# Patient Record
Sex: MALE | Race: WHITE | Employment: FULL TIME | URBAN - METROPOLITAN AREA
[De-identification: names, ages, dates, MRNs, and addresses within clinical notes are randomized per-mention and may not be internally consistent; named-entity substitution may affect disease eponyms.]

---

## 2017-03-24 ENCOUNTER — TELEPHONE (OUTPATIENT)
Dept: FAMILY MEDICINE CLINIC | Age: 37
End: 2017-03-24

## 2017-04-28 ENCOUNTER — PATIENT MESSAGE (OUTPATIENT)
Dept: FAMILY MEDICINE CLINIC | Age: 37
End: 2017-04-28

## 2017-04-28 ENCOUNTER — OFFICE VISIT (OUTPATIENT)
Dept: FAMILY MEDICINE CLINIC | Age: 37
End: 2017-04-28

## 2017-04-28 VITALS
BODY MASS INDEX: 39.17 KG/M2 | HEART RATE: 84 BPM | DIASTOLIC BLOOD PRESSURE: 78 MMHG | WEIGHT: 313.4 LBS | TEMPERATURE: 97.6 F | SYSTOLIC BLOOD PRESSURE: 128 MMHG | RESPIRATION RATE: 16 BRPM

## 2017-04-28 DIAGNOSIS — I89.0 LYMPHEDEMA OF LEFT LEG: ICD-10-CM

## 2017-04-28 DIAGNOSIS — J30.1 ALLERGIC RHINITIS DUE TO POLLEN, UNSPECIFIED RHINITIS SEASONALITY: ICD-10-CM

## 2017-04-28 DIAGNOSIS — I10 ESSENTIAL HYPERTENSION: ICD-10-CM

## 2017-04-28 DIAGNOSIS — E78.5 DYSLIPIDEMIA: ICD-10-CM

## 2017-04-28 DIAGNOSIS — K21.9 GASTROESOPHAGEAL REFLUX DISEASE WITHOUT ESOPHAGITIS: ICD-10-CM

## 2017-04-28 DIAGNOSIS — R73.01 IFG (IMPAIRED FASTING GLUCOSE): Primary | ICD-10-CM

## 2017-04-28 LAB — HBA1C MFR BLD: 6.1 %

## 2017-04-28 PROCEDURE — G8417 CALC BMI ABV UP PARAM F/U: HCPCS | Performed by: FAMILY MEDICINE

## 2017-04-28 PROCEDURE — 83036 HEMOGLOBIN GLYCOSYLATED A1C: CPT | Performed by: FAMILY MEDICINE

## 2017-04-28 PROCEDURE — G8427 DOCREV CUR MEDS BY ELIG CLIN: HCPCS | Performed by: FAMILY MEDICINE

## 2017-04-28 PROCEDURE — 99214 OFFICE O/P EST MOD 30 MIN: CPT | Performed by: FAMILY MEDICINE

## 2017-04-28 PROCEDURE — 1036F TOBACCO NON-USER: CPT | Performed by: FAMILY MEDICINE

## 2017-04-28 ASSESSMENT — ENCOUNTER SYMPTOMS
SHORTNESS OF BREATH: 0
BLOOD IN STOOL: 0
ABDOMINAL PAIN: 0
ANAL BLEEDING: 0
CHEST TIGHTNESS: 0
NAUSEA: 0
CONSTIPATION: 0
VOMITING: 0
DIARRHEA: 0

## 2017-05-02 ENCOUNTER — TELEPHONE (OUTPATIENT)
Dept: FAMILY MEDICINE CLINIC | Age: 37
End: 2017-05-02

## 2017-05-02 DIAGNOSIS — Z83.2 FAMILY HISTORY OF FACTOR V LEIDEN MUTATION: Primary | ICD-10-CM

## 2017-06-23 DIAGNOSIS — I10 ESSENTIAL HYPERTENSION: ICD-10-CM

## 2017-06-23 RX ORDER — LISINOPRIL AND HYDROCHLOROTHIAZIDE 12.5; 1 MG/1; MG/1
1 TABLET ORAL DAILY
Qty: 90 TABLET | Refills: 3 | Status: SHIPPED | OUTPATIENT
Start: 2017-06-23 | End: 2018-06-17 | Stop reason: SDUPTHER

## 2017-09-12 DIAGNOSIS — J30.1 ALLERGIC RHINITIS DUE TO POLLEN: ICD-10-CM

## 2017-09-13 RX ORDER — FLUTICASONE PROPIONATE 50 MCG
SPRAY, SUSPENSION (ML) NASAL
Qty: 3 BOTTLE | Refills: 3 | Status: SHIPPED | OUTPATIENT
Start: 2017-09-13 | End: 2018-06-26 | Stop reason: SDUPTHER

## 2017-09-25 ENCOUNTER — OFFICE VISIT (OUTPATIENT)
Dept: FAMILY MEDICINE CLINIC | Age: 37
End: 2017-09-25
Payer: COMMERCIAL

## 2017-09-25 VITALS
DIASTOLIC BLOOD PRESSURE: 74 MMHG | HEIGHT: 75 IN | HEART RATE: 72 BPM | BODY MASS INDEX: 37.95 KG/M2 | SYSTOLIC BLOOD PRESSURE: 124 MMHG | RESPIRATION RATE: 16 BRPM | WEIGHT: 305.2 LBS | TEMPERATURE: 97.4 F

## 2017-09-25 DIAGNOSIS — M54.2 ANTERIOR NECK PAIN: Primary | ICD-10-CM

## 2017-09-25 DIAGNOSIS — R22.1 NECK FULLNESS: ICD-10-CM

## 2017-09-25 PROCEDURE — G8427 DOCREV CUR MEDS BY ELIG CLIN: HCPCS | Performed by: FAMILY MEDICINE

## 2017-09-25 PROCEDURE — 1036F TOBACCO NON-USER: CPT | Performed by: FAMILY MEDICINE

## 2017-09-25 PROCEDURE — G8417 CALC BMI ABV UP PARAM F/U: HCPCS | Performed by: FAMILY MEDICINE

## 2017-09-25 PROCEDURE — 99213 OFFICE O/P EST LOW 20 MIN: CPT | Performed by: FAMILY MEDICINE

## 2017-09-25 RX ORDER — CEFUROXIME AXETIL 250 MG/1
250 TABLET ORAL 2 TIMES DAILY
Qty: 28 TABLET | Refills: 0 | Status: SHIPPED | OUTPATIENT
Start: 2017-09-25 | End: 2017-10-09

## 2017-09-25 ASSESSMENT — ENCOUNTER SYMPTOMS
SORE THROAT: 0
SINUS PRESSURE: 0
DIARRHEA: 0
CONSTIPATION: 0
RHINORRHEA: 0
EYE PAIN: 0
BLOOD IN STOOL: 0
CHEST TIGHTNESS: 0
VOMITING: 0
EYE REDNESS: 0
EYE ITCHING: 0
SHORTNESS OF BREATH: 0
COUGH: 0
EYE DISCHARGE: 0
ABDOMINAL PAIN: 0
ANAL BLEEDING: 0
NAUSEA: 0

## 2018-02-28 ENCOUNTER — OFFICE VISIT (OUTPATIENT)
Dept: FAMILY MEDICINE CLINIC | Age: 38
End: 2018-02-28
Payer: COMMERCIAL

## 2018-02-28 VITALS
HEART RATE: 76 BPM | DIASTOLIC BLOOD PRESSURE: 64 MMHG | HEIGHT: 75 IN | BODY MASS INDEX: 37.47 KG/M2 | TEMPERATURE: 97.9 F | SYSTOLIC BLOOD PRESSURE: 130 MMHG | WEIGHT: 301.4 LBS | RESPIRATION RATE: 16 BRPM

## 2018-02-28 DIAGNOSIS — R73.01 IFG (IMPAIRED FASTING GLUCOSE): ICD-10-CM

## 2018-02-28 DIAGNOSIS — K21.9 GASTROESOPHAGEAL REFLUX DISEASE WITHOUT ESOPHAGITIS: ICD-10-CM

## 2018-02-28 DIAGNOSIS — I10 ESSENTIAL HYPERTENSION: ICD-10-CM

## 2018-02-28 DIAGNOSIS — I89.0 LYMPHEDEMA OF LEFT LEG: ICD-10-CM

## 2018-02-28 DIAGNOSIS — Z00.00 WELL ADULT EXAM: Primary | ICD-10-CM

## 2018-02-28 DIAGNOSIS — R53.83 OTHER FATIGUE: ICD-10-CM

## 2018-02-28 DIAGNOSIS — J30.1 CHRONIC ALLERGIC RHINITIS DUE TO POLLEN, UNSPECIFIED SEASONALITY: ICD-10-CM

## 2018-02-28 PROCEDURE — 99395 PREV VISIT EST AGE 18-39: CPT | Performed by: FAMILY MEDICINE

## 2018-02-28 RX ORDER — FUROSEMIDE 40 MG/1
40 TABLET ORAL DAILY PRN
Qty: 30 TABLET | Refills: 0 | Status: SHIPPED | OUTPATIENT
Start: 2018-02-28 | End: 2019-06-04 | Stop reason: SDUPTHER

## 2018-02-28 RX ORDER — POTASSIUM CHLORIDE 20 MEQ/1
20 TABLET, EXTENDED RELEASE ORAL DAILY PRN
Qty: 30 TABLET | Refills: 0 | Status: SHIPPED | OUTPATIENT
Start: 2018-02-28 | End: 2019-06-04 | Stop reason: SDUPTHER

## 2018-02-28 ASSESSMENT — PATIENT HEALTH QUESTIONNAIRE - PHQ9
2. FEELING DOWN, DEPRESSED OR HOPELESS: 0
SUM OF ALL RESPONSES TO PHQ QUESTIONS 1-9: 0
SUM OF ALL RESPONSES TO PHQ9 QUESTIONS 1 & 2: 0
1. LITTLE INTEREST OR PLEASURE IN DOING THINGS: 0

## 2018-02-28 ASSESSMENT — ENCOUNTER SYMPTOMS
ANAL BLEEDING: 0
DIARRHEA: 0
CONSTIPATION: 0
CHEST TIGHTNESS: 0
BLOOD IN STOOL: 0
ABDOMINAL PAIN: 0
VOMITING: 0
SHORTNESS OF BREATH: 0
NAUSEA: 0

## 2018-02-28 NOTE — PROGRESS NOTES
[facility]  [facility]  Dept: [phone]  Dept Fax: [phone]    JENNIFER Calixto is a 42 y.o.male    Pt presents for annual wellness physical exam.      Pt complains of increasing Lymphedema in left lower extremity. No associated SOB, paroxysmal Nocturnal Dyspnea. Pt denies any lower extremity warmth, redness, or tenderness. Pt interested in potentially seeing Lymphedema Clinic or possible intermittent Lasix use. The home BP readings have been in the 110-120's / 70-80's range. Otherwise, pt feeling ok at this time. Weight decreased 12# since last visit 4/2017    Patient Active Problem List   Diagnosis    Dyslipidemia    Lymphedema of left leg- 1995- due to recurrent cellulitis    Essential hypertension    Gastroesophageal reflux disease without esophagitis    Allergic rhinitis due to pollen       Current Outpatient Prescriptions   Medication Sig Dispense Refill    furosemide (LASIX) 40 MG tablet Take 1 tablet by mouth daily as needed (edema) 30 tablet 0    potassium chloride (KLOR-CON M) 20 MEQ extended release tablet Take 1 tablet by mouth daily as needed (edema/ Lasix use) 30 tablet 0    fluticasone (FLONASE) 50 MCG/ACT nasal spray instill 2 spray into each nostril once daily 3 Bottle 3    lisinopril-hydrochlorothiazide (PRINZIDE;ZESTORETIC) 10-12.5 MG per tablet Take 1 tablet by mouth daily 90 tablet 3    ranitidine (ZANTAC) 150 MG tablet Take 150 mg by mouth daily        No current facility-administered medications for this visit. Review of Systems   Constitutional: Positive for fatigue. Negative for chills, diaphoresis, fever and unexpected weight change. Eyes: Negative for visual disturbance. Respiratory: Negative for chest tightness and shortness of breath. Cardiovascular: Positive for leg swelling. Negative for chest pain and palpitations.    Gastrointestinal: Negative for abdominal pain, anal bleeding, blood in stool,

## 2018-03-16 LAB
ABSOLUTE BASO #: 0 K/UL (ref 0–0.1)
ABSOLUTE EOS #: 0.1 K/UL (ref 0.1–0.4)
ABSOLUTE LYMPH #: 0.7 K/UL (ref 0.8–5.2)
ABSOLUTE MONO #: 0.7 K/UL (ref 0.1–0.9)
ABSOLUTE NEUT #: 4.7 K/UL (ref 1.3–9.1)
ALBUMIN SERPL-MCNC: 3.3 G/DL (ref 3.5–5.2)
ALK PHOSPHATASE: 47 U/L (ref 39–118)
ALT SERPL-CCNC: 43 U/L (ref 5–50)
ANION GAP SERPL CALCULATED.3IONS-SCNC: 8 MEQ/L (ref 10–19)
AST SERPL-CCNC: 26 U/L (ref 9–50)
AVERAGE GLUCOSE: 123 MG/DL (ref 66–114)
BASOPHILS RELATIVE PERCENT: 0.3 %
BILIRUB SERPL-MCNC: 0.4 MG/DL
BUN BLDV-MCNC: 21 MG/DL (ref 8–23)
CALCIUM SERPL-MCNC: 8.1 MG/DL (ref 8.5–10.5)
CHLORIDE BLD-SCNC: 102 MEQ/L (ref 95–107)
CHOLESTEROL/HDL RATIO: 3.8
CHOLESTEROL: 146 MG/DL
CO2: 30 MEQ/L (ref 19–31)
CREAT SERPL-MCNC: 1.1 MG/DL (ref 0.8–1.4)
EGFR AFRICAN AMERICAN: 97.5 ML/MIN/1.73 M2
EGFR IF NONAFRICAN AMERICAN: 84.1 ML/MIN/1.73 M2
EOSINOPHILS RELATIVE PERCENT: 2.1 %
GLUCOSE: 99 MG/DL (ref 70–99)
HBA1C MFR BLD: 5.9 % (ref 4.2–5.8)
HCT VFR BLD CALC: 52.1 % (ref 41.4–51)
HDLC SERPL-MCNC: 38 MG/DL
HEMOGLOBIN: 18 G/DL (ref 13.8–17)
LDL CHOLESTEROL CALCULATED: 96 MG/DL
LDL/HDL RATIO: 2.5
LYMPHOCYTE %: 10.7 %
MCH RBC QN AUTO: 30.2 PG (ref 27–34)
MCHC RBC AUTO-ENTMCNC: 34.5 G/DL (ref 31–36)
MCV RBC AUTO: 87.4 FL (ref 80–100)
MONOCYTES # BLD: 10.6 %
NEUTROPHILS RELATIVE PERCENT: 76 %
PDW BLD-RTO: 13.3 % (ref 10.8–14.8)
PLATELETS: 226 K/UL (ref 150–450)
POTASSIUM SERPL-SCNC: 4.3 MEQ/L (ref 3.5–5.4)
RBC: 5.96 M/UL (ref 4–5.5)
SODIUM BLD-SCNC: 140 MEQ/L (ref 135–146)
T4 FREE: 1.05 NG/DL (ref 0.8–1.9)
TOTAL PROTEIN: 5.2 G/DL (ref 6.1–8.3)
TRIGL SERPL-MCNC: 62 MG/DL
TSH SERPL DL<=0.05 MIU/L-ACNC: 3.03 UIU/ML (ref 0.4–4.1)
VLDLC SERPL CALC-MCNC: 12 MG/DL
WBC: 6.2 K/UL (ref 3.7–10.8)

## 2018-03-19 LAB — TESTOSTERONE FREE-MALE: 73.3 PG/ML (ref 47–244)

## 2018-03-20 LAB — FACTOR V LEIDEN: ABNORMAL

## 2018-03-21 ENCOUNTER — TELEPHONE (OUTPATIENT)
Dept: FAMILY MEDICINE CLINIC | Age: 38
End: 2018-03-21

## 2018-03-21 DIAGNOSIS — E88.09 SERUM ALBUMIN DECREASED: ICD-10-CM

## 2018-03-21 DIAGNOSIS — R71.8 ELEVATED HEMATOCRIT: Primary | ICD-10-CM

## 2018-03-21 DIAGNOSIS — R79.0 CALCIUM BLOOD DECREASED: ICD-10-CM

## 2018-03-21 NOTE — TELEPHONE ENCOUNTER
I spoke to the patient and notified him of lab results and ES response/recommendations. Patient verbalized understanding. Labs mailed to private residence.

## 2018-06-17 DIAGNOSIS — I10 ESSENTIAL HYPERTENSION: ICD-10-CM

## 2018-06-18 RX ORDER — LISINOPRIL AND HYDROCHLOROTHIAZIDE 12.5; 1 MG/1; MG/1
TABLET ORAL
Qty: 90 TABLET | Refills: 3 | Status: SHIPPED | OUTPATIENT
Start: 2018-06-18 | End: 2018-06-26 | Stop reason: SDUPTHER

## 2018-06-25 DIAGNOSIS — I10 ESSENTIAL HYPERTENSION: ICD-10-CM

## 2018-06-26 RX ORDER — LISINOPRIL AND HYDROCHLOROTHIAZIDE 12.5; 1 MG/1; MG/1
TABLET ORAL
Qty: 90 TABLET | Refills: 3 | Status: SHIPPED | OUTPATIENT
Start: 2018-06-26 | End: 2019-06-18 | Stop reason: SDUPTHER

## 2018-06-26 RX ORDER — FLUTICASONE PROPIONATE 50 MCG
SPRAY, SUSPENSION (ML) NASAL
Qty: 3 BOTTLE | Refills: 3 | Status: SHIPPED | OUTPATIENT
Start: 2018-06-26 | End: 2019-06-18 | Stop reason: SDUPTHER

## 2018-09-06 ENCOUNTER — OFFICE VISIT (OUTPATIENT)
Dept: FAMILY MEDICINE CLINIC | Age: 38
End: 2018-09-06
Payer: COMMERCIAL

## 2018-09-06 VITALS
BODY MASS INDEX: 38.9 KG/M2 | RESPIRATION RATE: 16 BRPM | HEART RATE: 100 BPM | DIASTOLIC BLOOD PRESSURE: 80 MMHG | WEIGHT: 311.2 LBS | SYSTOLIC BLOOD PRESSURE: 136 MMHG | TEMPERATURE: 98 F

## 2018-09-06 DIAGNOSIS — R73.01 IFG (IMPAIRED FASTING GLUCOSE): ICD-10-CM

## 2018-09-06 DIAGNOSIS — K21.9 GASTROESOPHAGEAL REFLUX DISEASE WITHOUT ESOPHAGITIS: ICD-10-CM

## 2018-09-06 DIAGNOSIS — D58.2 ELEVATED HEMOGLOBIN (HCC): ICD-10-CM

## 2018-09-06 DIAGNOSIS — I89.0 LYMPHEDEMA OF LEFT LEG: ICD-10-CM

## 2018-09-06 DIAGNOSIS — D68.51 FACTOR 5 LEIDEN MUTATION, HETEROZYGOUS (HCC): ICD-10-CM

## 2018-09-06 DIAGNOSIS — J30.1 ALLERGIC RHINITIS DUE TO POLLEN, UNSPECIFIED SEASONALITY: ICD-10-CM

## 2018-09-06 DIAGNOSIS — E78.5 DYSLIPIDEMIA: ICD-10-CM

## 2018-09-06 DIAGNOSIS — I10 ESSENTIAL HYPERTENSION: Primary | ICD-10-CM

## 2018-09-06 PROCEDURE — G8427 DOCREV CUR MEDS BY ELIG CLIN: HCPCS | Performed by: FAMILY MEDICINE

## 2018-09-06 PROCEDURE — 99214 OFFICE O/P EST MOD 30 MIN: CPT | Performed by: FAMILY MEDICINE

## 2018-09-06 PROCEDURE — 1036F TOBACCO NON-USER: CPT | Performed by: FAMILY MEDICINE

## 2018-09-06 PROCEDURE — G8417 CALC BMI ABV UP PARAM F/U: HCPCS | Performed by: FAMILY MEDICINE

## 2018-09-06 ASSESSMENT — ENCOUNTER SYMPTOMS
NAUSEA: 0
CONSTIPATION: 0
ANAL BLEEDING: 0
DIARRHEA: 0
ABDOMINAL PAIN: 0
VOMITING: 0
SHORTNESS OF BREATH: 0
CHEST TIGHTNESS: 0
BLOOD IN STOOL: 0

## 2018-09-06 NOTE — PATIENT INSTRUCTIONS
Encouraged annual FLU VACCINE after October 1st.  Encouraged TETANUS SHOT (TdaP/ ADACEL/ [address]) per employee health/ personal pharmacy. Check FERRITIN, SOLUBLE TRANSFERRIN RECEPTOR,  and SERUM TRANSFERRIN with previously ordered H/H and CMP at this time. Continue to work on diet, exercise, and weight loss for optimal cardiovascular health. Home BP's- call if > 140/90 on a regular basis. Check HGA1C, FLP, CMP, and CBC in 6 months. Continue current meds  No refills needed today.    Follow up in 6 months.

## 2018-09-06 NOTE — PROGRESS NOTES
Neurological: Negative for dizziness, light-headedness and headaches. OBJECTIVE     /80   Pulse 100   Temp 98 °F (36.7 °C) (Oral)   Resp 16   Wt (!) 311 lb 3.2 oz (141.2 kg)   BMI 38.90 kg/m²    -had coffee and meeting this AM-     Wt Readings from Last 3 Encounters:   09/06/18 (!) 311 lb 3.2 oz (141.2 kg)   02/28/18 (!) 301 lb 6.4 oz (136.7 kg)   09/25/17 (!) 305 lb 3.2 oz (138.4 kg)     Body mass index is 38.9 kg/m². Physical Exam   Constitutional: He is oriented to person, place, and time. He appears well-developed and well-nourished. HENT:   Head: Normocephalic and atraumatic. Right Ear: External ear normal.   Left Ear: External ear normal.   Nose: Nose normal.   Mouth/Throat: Oropharynx is clear and moist.   Eyes: Pupils are equal, round, and reactive to light. Conjunctivae and EOM are normal.   Neck: Normal range of motion. Neck supple. Cardiovascular: Normal rate, regular rhythm and intact distal pulses. Pulmonary/Chest: Effort normal and breath sounds normal.   Abdominal: Soft. Bowel sounds are normal.   Genitourinary:   Genitourinary Comments: MASSIEL deferred today as pt currently asymptomatic. Pt denies dysuria, hematuria, frequency, urgency, difficulty starting/ stopping stream, frequent nocturia    Will reconsider annually. ES     Musculoskeletal: Normal range of motion. Neurological: He is alert and oriented to person, place, and time. He has normal reflexes. Skin: Skin is warm and dry. Psychiatric: He has a normal mood and affect. His behavior is normal. Judgment and thought content normal.   Nursing note and vitals reviewed.       Component      Latest Ref Rng & Units 3/15/2018   WBC      3.7 - 10.8 K/ul 6.2   RBC      4.00 - 5.50 M/ul 5.96 (H)   Hemoglobin Quant      13.8 - 17.0 g/dL 18.0 (H)   Hematocrit      41.4 - 51.0 % 52.1 (H)   MCV      80 - 100 fl 87.4   MCH      27.0 - 34.0 pg 30.2   MCHC      31.0 - 36.0 g/dl 34.5   RDW      10.8 - 14.8 % 13.3   Platelet Count      150 - 450 K/ul 226   Absolute Neut #      1.3 - 9.1 K/ul 4.7   Neutrophils %      % 76.0   Absolute Lymph #      0.8 - 5.2 K/ul 0.7 (L)   Lymphocyte %      % 10.7   Absolute Mono #      0.1 - 0.9 K/ul 0.7   Monocytes      % 10.6   Absolute Eos #      0.1 - 0.4 K/ul 0.1   Eosinophils %      % 2.1   Basophils #      0.0 - 0.1 K/ul 0.0   Basophils %      % 0.3   Glucose      70 - 99 mg/dL 99   BUN      8 - 23 mg/dL 21   Creatinine      0.8 - 1.4 mg/dL 1.1   eGFR African American      >59 mL/min/1.73 m2 97.5   EGFR IF NonAfrican American      >59 mL/min/1.73 m2 84.1   Calcium      8.5 - 10.5 mg/dL 8.1 (L)   Sodium      135 - 146 mEq/L 140   Potassium      3.5 - 5.4 mEq/L 4.3   Chloride      95 - 107 mEq/L 102   CO2      19 - 31 mEq/L 30   Anion Gap      10 - 19 mEq/L 8 (L)   Bilirubin      <1.3 mg/dL 0.4   Alk Phosphatase      39 - 118 U/L 47   AST      9 - 50 U/L 26   ALT      5 - 50 U/L 43   Total Protein      6.1 - 8.3 g/dL 5.2 (L)   Albumin      3.5 - 5.2 g/dL 3.3 (L)   Cholesterol      <200 mg/dL 146   Triglycerides      <150 mg/dL 62   HDL Cholesterol      >39 mg/dL 38 (L)   LDL Calculated      <130 mg/dL 96   VLDL Cholesterol Calculated      <30 mg/dL 12   LDl/HDL Ratio      <3.5 2.5   Chol/HDL Ratio      <5 3.8   Hemoglobin A1C      4.2 - 5.8 % 5.9 (H)   AVERAGE GLUCOSE      66 - 114 mg/dL 123 (H)   TSH      0.4 - 4.1 uIU/mL 3.03   T4 Free      0.80 - 1.90 ng/dL 1.05   Factor V Leiden      NEGATIVE HETEROZYGOUS (A)   Free Testosterone      47.0 - 244.0 pg/mL 73.3       Lab Results   Component Value Date    LABA1C 5.9 (H) 03/15/2018       Lab Results   Component Value Date    CHOL 146 03/15/2018    TRIG 62 03/15/2018    HDL 38 (L) 03/15/2018    LDLCALC 96 03/15/2018       The 10-year ASCVD risk score (Myrtle Ramirez, et al., 2013) is: 1.1%    Values used to calculate the score:      Age: 34 years      Sex: Male      Is Non- : No      Diabetic: No      Tobacco smoker: No Transferrin    Soluble Transferrin Receptor    CBC Auto Differential   8. IFG (impaired fasting glucose)  Hemoglobin A1C    Comprehensive Metabolic Panel       PLAN      Encouraged annual FLU VACCINE after October 1st.  Encouraged TETANUS SHOT (TdaP/ ADACEL/ BOOSTRIX) per employee health/ personal pharmacy. Check FERRITIN, SOLUBLE TRANSFERRIN RECEPTOR,  and SERUM TRANSFERRIN with previously ordered H/H and CMP at this time. Continue to work on diet, exercise, and weight loss for optimal cardiovascular health. Home BP's- call if > 140/90 on a regular basis. Check HGA1C, FLP, CMP, and CBC in 6 months. Continue current meds  No refills needed today.    Follow up in 6 months.                     Electronically signed by Rafa Boyd MD on 9/6/2018 at 8:36 AM

## 2018-09-13 ENCOUNTER — HOSPITAL ENCOUNTER (OUTPATIENT)
Age: 38
Discharge: HOME | End: 2018-09-13
Payer: COMMERCIAL

## 2018-09-13 DIAGNOSIS — R79.0 CALCIUM BLOOD DECREASED: ICD-10-CM

## 2018-09-13 DIAGNOSIS — R71.8 ELEVATED HEMATOCRIT: ICD-10-CM

## 2018-09-13 DIAGNOSIS — E88.09 SERUM ALBUMIN DECREASED: ICD-10-CM

## 2018-09-13 DIAGNOSIS — D58.2 ELEVATED HEMOGLOBIN (HCC): ICD-10-CM

## 2018-09-13 LAB
ALBUMIN SERPL-MCNC: 3.3 G/DL (ref 3.5–5.1)
ALP BLD-CCNC: 47 U/L (ref 38–126)
ALT SERPL-CCNC: 46 U/L (ref 11–66)
ANION GAP SERPL CALCULATED.3IONS-SCNC: 11 MEQ/L (ref 8–16)
AST SERPL-CCNC: 29 U/L (ref 5–40)
BILIRUB SERPL-MCNC: 0.4 MG/DL (ref 0.3–1.2)
BUN BLDV-MCNC: 21 MG/DL (ref 7–22)
CALCIUM SERPL-MCNC: 8.1 MG/DL (ref 8.5–10.5)
CHLORIDE BLD-SCNC: 105 MEQ/L (ref 98–111)
CO2: 28 MEQ/L (ref 23–33)
CREAT SERPL-MCNC: 1.2 MG/DL (ref 0.4–1.2)
FERRITIN: 196 NG/ML (ref 22–322)
GFR SERPL CREATININE-BSD FRML MDRD: 67 ML/MIN/1.73M2
GLUCOSE BLD-MCNC: 117 MG/DL (ref 70–108)
HCT VFR BLD CALC: 54.5 % (ref 42–52)
HEMOGLOBIN: 18.6 GM/DL (ref 14–18)
POTASSIUM SERPL-SCNC: 4.2 MEQ/L (ref 3.5–5.2)
SODIUM BLD-SCNC: 144 MEQ/L (ref 135–145)
TOTAL PROTEIN: 5.2 G/DL (ref 6.1–8)

## 2018-09-13 PROCEDURE — 84466 ASSAY OF TRANSFERRIN: CPT

## 2018-09-13 PROCEDURE — 80053 COMPREHEN METABOLIC PANEL: CPT

## 2018-09-13 PROCEDURE — 85014 HEMATOCRIT: CPT

## 2018-09-13 PROCEDURE — 36415 COLL VENOUS BLD VENIPUNCTURE: CPT

## 2018-09-13 PROCEDURE — 84238 ASSAY NONENDOCRINE RECEPTOR: CPT

## 2018-09-13 PROCEDURE — 85018 HEMOGLOBIN: CPT

## 2018-09-13 PROCEDURE — 82728 ASSAY OF FERRITIN: CPT

## 2018-09-15 LAB
SOLUBLE TRANSFERRIN RECEPT: 1.3 MG/L (ref 2.2–5)
TRANSFERRIN: 232 MG/DL (ref 200–400)

## 2018-09-18 ENCOUNTER — TELEPHONE (OUTPATIENT)
Dept: FAMILY MEDICINE CLINIC | Age: 38
End: 2018-09-18

## 2018-09-18 ENCOUNTER — PATIENT MESSAGE (OUTPATIENT)
Dept: FAMILY MEDICINE CLINIC | Age: 38
End: 2018-09-18

## 2018-09-18 DIAGNOSIS — R71.8 ELEVATED HEMATOCRIT: Primary | ICD-10-CM

## 2018-09-18 DIAGNOSIS — R79.89 ABNORMAL CBC: ICD-10-CM

## 2018-09-18 DIAGNOSIS — D58.2 ELEVATED HEMOGLOBIN (HCC): ICD-10-CM

## 2018-09-18 DIAGNOSIS — R73.09 ELEVATED GLUCOSE: ICD-10-CM

## 2018-09-18 DIAGNOSIS — D75.1 POLYCYTHEMIA: ICD-10-CM

## 2018-09-18 NOTE — TELEPHONE ENCOUNTER
----- Message from Myrna Asencio MD sent at 9/16/2018 10:05 AM EDT -----  Notify pt-   Results reviewed. CMP ok, except elevated GLUCOSE, low CALCIUM, low TP and low ALBUMIN  H/H remains elevated  FERRITIN normal  TRANSFERRIN normal  Soluble Transferrin Receptor low  Check UA (looking for protein loss) and HGA1C at this time. Refer to Hematology (Dr. Kirk Heimlich) to evaluate elevated H/H, decreased Soluble Transferrin Receptor.   ES

## 2018-09-18 NOTE — TELEPHONE ENCOUNTER
Pt notified, orders faxed to [facility] ambulatory per pt request. Referral to Dr. Chase Cunningham office will call pt to schedule. Pt notified.

## 2018-09-18 NOTE — TELEPHONE ENCOUNTER
From: Marleen Castro  To: Penny Peña MD  Sent: 9/18/2018 3:11 PM EDT  Subject: Test Results Question    [facility] can those be added so I can see them?  Thanks Jeremy Baltazar

## 2018-10-01 ENCOUNTER — HOSPITAL ENCOUNTER (OUTPATIENT)
Age: 38
Discharge: HOME | End: 2018-10-01
Payer: COMMERCIAL

## 2018-10-01 DIAGNOSIS — D75.1 POLYCYTHEMIA: ICD-10-CM

## 2018-10-01 DIAGNOSIS — R73.09 ELEVATED GLUCOSE: ICD-10-CM

## 2018-10-01 LAB
AVERAGE GLUCOSE: 117 MG/DL (ref 70–126)
BILIRUBIN URINE: NEGATIVE
BLOOD, URINE: NEGATIVE
CHARACTER, URINE: CLEAR
COLOR: YELLOW
GLUCOSE, URINE: NEGATIVE MG/DL
HBA1C MFR BLD: 5.9 % (ref 4.4–6.4)
KETONES, URINE: NEGATIVE
LEUKOCYTE EST, POC: NEGATIVE
NITRITE, URINE: NEGATIVE
PH UA: 6.5
PROTEIN UA: NEGATIVE MG/DL
SPECIFIC GRAVITY UA: 1.02 (ref 1–1.03)
UROBILINOGEN, URINE: 1 EU/DL

## 2018-10-01 PROCEDURE — 83036 HEMOGLOBIN GLYCOSYLATED A1C: CPT

## 2018-10-01 PROCEDURE — 81003 URINALYSIS AUTO W/O SCOPE: CPT

## 2018-10-01 PROCEDURE — 36415 COLL VENOUS BLD VENIPUNCTURE: CPT

## 2018-10-29 ENCOUNTER — HOSPITAL ENCOUNTER (OUTPATIENT)
Dept: INFUSION THERAPY | Age: 38
Discharge: HOME | End: 2018-10-29
Payer: COMMERCIAL

## 2018-10-29 ENCOUNTER — OFFICE VISIT (OUTPATIENT)
Dept: ONCOLOGY | Age: 38
End: 2018-10-29
Payer: COMMERCIAL

## 2018-10-29 VITALS
SYSTOLIC BLOOD PRESSURE: 119 MMHG | WEIGHT: 311 LBS | HEIGHT: 75 IN | DIASTOLIC BLOOD PRESSURE: 83 MMHG | BODY MASS INDEX: 38.67 KG/M2 | TEMPERATURE: 97.2 F | HEART RATE: 91 BPM | OXYGEN SATURATION: 100 % | RESPIRATION RATE: 20 BRPM

## 2018-10-29 DIAGNOSIS — D75.1 POLYCYTHEMIA: ICD-10-CM

## 2018-10-29 DIAGNOSIS — D75.1 POLYCYTHEMIA: Primary | ICD-10-CM

## 2018-10-29 LAB
BASOPHILS # BLD: 0.4 %
BASOPHILS ABSOLUTE: 0 THOU/MM3 (ref 0–0.1)
EOSINOPHIL # BLD: 1.2 %
EOSINOPHILS ABSOLUTE: 0.1 THOU/MM3 (ref 0–0.4)
ERYTHROCYTE [DISTWIDTH] IN BLOOD BY AUTOMATED COUNT: 12.5 % (ref 11.5–14.5)
ERYTHROCYTE [DISTWIDTH] IN BLOOD BY AUTOMATED COUNT: 39.1 FL (ref 35–45)
HCT VFR BLD CALC: 54.7 % (ref 42–52)
HEMOGLOBIN: 18.7 GM/DL (ref 14–18)
IMMATURE GRANS (ABS): 0.04 THOU/MM3 (ref 0–0.07)
IMMATURE GRANULOCYTES: 0.5 %
LD: 171 U/L (ref 100–190)
LYMPHOCYTES # BLD: 7.8 %
LYMPHOCYTES ABSOLUTE: 0.6 THOU/MM3 (ref 1–4.8)
MCH RBC QN AUTO: 29.8 PG (ref 26–33)
MCHC RBC AUTO-ENTMCNC: 34.2 GM/DL (ref 32.2–35.5)
MCV RBC AUTO: 87.1 FL (ref 80–94)
MONOCYTES # BLD: 9 %
MONOCYTES ABSOLUTE: 0.7 THOU/MM3 (ref 0.4–1.3)
NUCLEATED RED BLOOD CELLS: 0 /100 WBC
PLATELET # BLD: 225 THOU/MM3 (ref 130–400)
PMV BLD AUTO: 10.5 FL (ref 9.4–12.4)
RBC # BLD: 6.28 MILL/MM3 (ref 4.7–6.1)
SEG NEUTROPHILS: 81.1 %
SEGMENTED NEUTROPHILS ABSOLUTE COUNT: 6.6 THOU/MM3 (ref 1.8–7.7)
WBC # BLD: 8.1 THOU/MM3 (ref 4.8–10.8)

## 2018-10-29 PROCEDURE — G8484 FLU IMMUNIZE NO ADMIN: HCPCS | Performed by: INTERNAL MEDICINE

## 2018-10-29 PROCEDURE — 99204 OFFICE O/P NEW MOD 45 MIN: CPT | Performed by: INTERNAL MEDICINE

## 2018-10-29 PROCEDURE — 81270 JAK2 GENE: CPT

## 2018-10-29 PROCEDURE — 85025 COMPLETE CBC W/AUTO DIFF WBC: CPT

## 2018-10-29 PROCEDURE — 83615 LACTATE (LD) (LDH) ENZYME: CPT

## 2018-10-29 PROCEDURE — G8417 CALC BMI ABV UP PARAM F/U: HCPCS | Performed by: INTERNAL MEDICINE

## 2018-10-29 PROCEDURE — 82668 ASSAY OF ERYTHROPOIETIN: CPT

## 2018-10-29 PROCEDURE — G8427 DOCREV CUR MEDS BY ELIG CLIN: HCPCS | Performed by: INTERNAL MEDICINE

## 2018-10-29 PROCEDURE — 1036F TOBACCO NON-USER: CPT | Performed by: INTERNAL MEDICINE

## 2018-10-29 PROCEDURE — 99211 OFF/OP EST MAY X REQ PHY/QHP: CPT

## 2018-10-29 PROCEDURE — 36415 COLL VENOUS BLD VENIPUNCTURE: CPT

## 2018-10-29 NOTE — PROGRESS NOTES
light-headedness, numbness and headaches. Hematological: Negative for adenopathy. Does not bruise/bleed easily. Psychiatric/Behavioral: Negative for confusion and sleep disturbance. The patient is not nervous/anxious. Objective:   Physical Exam   Constitutional: He is oriented to person, place, and time. He appears well-developed and well-nourished. No distress. HENT:   Head: Normocephalic. Mouth/Throat: Oropharynx is clear and moist. No oropharyngeal exudate. Eyes: Pupils are equal, round, and reactive to light. EOM are normal. Right eye exhibits no discharge. Left eye exhibits no discharge. No scleral icterus. Neck: Normal range of motion. Neck supple. No JVD present. No tracheal deviation present. No thyromegaly present. Cardiovascular: Normal rate and normal heart sounds. Exam reveals no gallop and no friction rub. No murmur heard. Pulmonary/Chest: Effort normal and breath sounds normal. No stridor. No respiratory distress. He has no wheezes. He has no rales. He exhibits no tenderness. Abdominal: Soft. Bowel sounds are normal. He exhibits no distension and no mass. There is no tenderness. There is no rebound. Musculoskeletal: Normal range of motion. He exhibits no edema. Good range of motion in all four extremities. Lymphadenopathy:     He has no cervical adenopathy. Neurological: He is alert and oriented to person, place, and time. He has normal reflexes. No cranial nerve deficit. He exhibits normal muscle tone. Skin: Skin is warm. No rash noted. No erythema. Psychiatric: He has a normal mood and affect. His behavior is normal. Judgment and thought content normal.   Vitals reviewed. /83 (Site: Left Upper Arm, Position: Sitting, Cuff Size: Large Adult)   Pulse 91   Temp 97.2 °F (36.2 °C) (Oral)   Resp 20   Ht 6' 3\" (1.905 m)   Wt (!) 311 lb (141.1 kg)   SpO2 100%   BMI 38.87 kg/m²      ECOG status is 0.     Imaging studies and labs:   No results

## 2018-10-30 ASSESSMENT — ENCOUNTER SYMPTOMS
VOMITING: 0
BLOOD IN STOOL: 0
EYE DISCHARGE: 0
DIARRHEA: 0
FACIAL SWELLING: 0
ABDOMINAL PAIN: 0
RECTAL PAIN: 0
COLOR CHANGE: 0
WHEEZING: 0
SHORTNESS OF BREATH: 0
TROUBLE SWALLOWING: 0
CONSTIPATION: 0
NAUSEA: 0
SORE THROAT: 0
ABDOMINAL DISTENTION: 0
CHEST TIGHTNESS: 0
COUGH: 0
BACK PAIN: 0

## 2018-10-31 LAB — ERYTHROPOIETIN: 14 MU/ML (ref 4–27)

## 2018-11-02 LAB — MISC. #1 REFERENCE GROUP TEST: NORMAL

## 2018-11-12 DIAGNOSIS — D68.51 FACTOR 5 LEIDEN MUTATION, HETEROZYGOUS (HCC): Primary | ICD-10-CM

## 2018-11-13 ENCOUNTER — OFFICE VISIT (OUTPATIENT)
Dept: ONCOLOGY | Age: 38
End: 2018-11-13
Payer: COMMERCIAL

## 2018-11-13 ENCOUNTER — HOSPITAL ENCOUNTER (OUTPATIENT)
Dept: INFUSION THERAPY | Age: 38
Discharge: HOME | End: 2018-11-13
Payer: COMMERCIAL

## 2018-11-13 VITALS
BODY MASS INDEX: 38.32 KG/M2 | RESPIRATION RATE: 18 BRPM | HEART RATE: 85 BPM | DIASTOLIC BLOOD PRESSURE: 78 MMHG | SYSTOLIC BLOOD PRESSURE: 129 MMHG | WEIGHT: 308.2 LBS | HEIGHT: 75 IN | OXYGEN SATURATION: 98 % | TEMPERATURE: 97.1 F

## 2018-11-13 DIAGNOSIS — D75.1 POLYCYTHEMIA: Primary | ICD-10-CM

## 2018-11-13 DIAGNOSIS — D68.51 FACTOR 5 LEIDEN MUTATION, HETEROZYGOUS (HCC): ICD-10-CM

## 2018-11-13 DIAGNOSIS — D75.1 POLYCYTHEMIA: ICD-10-CM

## 2018-11-13 LAB
ALBUMIN SERPL-MCNC: 3.4 G/DL (ref 3.5–5.1)
ALP BLD-CCNC: 50 U/L (ref 38–126)
ALT SERPL-CCNC: 43 U/L (ref 11–66)
AST SERPL-CCNC: 30 U/L (ref 5–40)
BASOPHILS # BLD: 0.4 %
BASOPHILS ABSOLUTE: 0 THOU/MM3 (ref 0–0.1)
BILIRUB SERPL-MCNC: 0.2 MG/DL (ref 0.3–1.2)
BILIRUBIN DIRECT: < 0.2 MG/DL (ref 0–0.3)
BUN, WHOLE BLOOD: 18 MG/DL (ref 8–26)
CHLORIDE, WHOLE BLOOD: 96 MEQ/L (ref 98–109)
CREATININE, WHOLE BLOOD: 1.2 MG/DL (ref 0.5–1.2)
EOSINOPHIL # BLD: 0.9 %
EOSINOPHILS ABSOLUTE: 0.1 THOU/MM3 (ref 0–0.4)
GFR, ESTIMATED: 71 ML/MIN/1.73M2
GLUCOSE, WHOLE BLOOD: 100 MG/DL (ref 70–108)
HCT VFR BLD CALC: 55.7 % (ref 42–52)
HEMOGLOBIN: 19.1 GM/DL (ref 14–18)
IMMATURE GRANS (ABS): 0.03 THOU/MM3 (ref 0–0.07)
IMMATURE GRANULOCYTES: 0.4 %
IONIZED CALCIUM, WHOLE BLOOD: 1.11 MMOL/L (ref 1.12–1.32)
LYMPHOCYTES # BLD: 8.6 %
LYMPHOCYTES ABSOLUTE: 0.7 THOU/MM3 (ref 1–4.8)
MCH RBC QN AUTO: 30.1 PG (ref 27–31)
MCHC RBC AUTO-ENTMCNC: 34.3 GM/DL (ref 33–37)
MCV RBC AUTO: 88 FL (ref 80–94)
MONOCYTES # BLD: 10.8 %
MONOCYTES ABSOLUTE: 0.8 THOU/MM3 (ref 0.4–1.3)
NUCLEATED RED BLOOD CELLS: 0 /100 WBC
PDW BLD-RTO: 11.3 % (ref 11.5–14.5)
PLATELET # BLD: 194 THOU/MM3 (ref 130–400)
PMV BLD AUTO: 7.8 FL (ref 7.4–10.4)
POTASSIUM, WHOLE BLOOD: 4.2 MEQ/L (ref 3.5–4.9)
RBC # BLD: 6.33 MILL/MM3 (ref 4.7–6.1)
SEG NEUTROPHILS: 78.9 %
SEGMENTED NEUTROPHILS ABSOLUTE COUNT: 6.2 THOU/MM3 (ref 1.8–7.7)
SODIUM, WHOLE BLOOD: 141 MEQ/L (ref 138–146)
TOTAL CO2, WHOLE BLOOD: 29 MEQ/L (ref 23–33)
TOTAL PROTEIN: 5.7 G/DL (ref 6.1–8)
WBC # BLD: 7.8 THOU/MM3 (ref 4.8–10.8)

## 2018-11-13 PROCEDURE — 81403 MOPATH PROCEDURE LEVEL 4: CPT

## 2018-11-13 PROCEDURE — 99213 OFFICE O/P EST LOW 20 MIN: CPT | Performed by: INTERNAL MEDICINE

## 2018-11-13 PROCEDURE — 85025 COMPLETE CBC W/AUTO DIFF WBC: CPT

## 2018-11-13 PROCEDURE — 80076 HEPATIC FUNCTION PANEL: CPT

## 2018-11-13 PROCEDURE — G8417 CALC BMI ABV UP PARAM F/U: HCPCS | Performed by: INTERNAL MEDICINE

## 2018-11-13 PROCEDURE — G8484 FLU IMMUNIZE NO ADMIN: HCPCS | Performed by: INTERNAL MEDICINE

## 2018-11-13 PROCEDURE — 1036F TOBACCO NON-USER: CPT | Performed by: INTERNAL MEDICINE

## 2018-11-13 PROCEDURE — 99211 OFF/OP EST MAY X REQ PHY/QHP: CPT

## 2018-11-13 PROCEDURE — 36415 COLL VENOUS BLD VENIPUNCTURE: CPT

## 2018-11-13 PROCEDURE — 80047 BASIC METABLC PNL IONIZED CA: CPT

## 2018-11-13 PROCEDURE — G8427 DOCREV CUR MEDS BY ELIG CLIN: HCPCS | Performed by: INTERNAL MEDICINE

## 2018-11-13 ASSESSMENT — ENCOUNTER SYMPTOMS
BLOOD IN STOOL: 0
DIARRHEA: 0
COLOR CHANGE: 0
WHEEZING: 0
CONSTIPATION: 0
SORE THROAT: 0
SHORTNESS OF BREATH: 0
EYE DISCHARGE: 0
ABDOMINAL PAIN: 0
FACIAL SWELLING: 0
NAUSEA: 0
TROUBLE SWALLOWING: 0
ABDOMINAL DISTENTION: 0
RECTAL PAIN: 0
COUGH: 0
VOMITING: 0
CHEST TIGHTNESS: 0
BACK PAIN: 0

## 2018-11-13 NOTE — PROGRESS NOTES
Hematological: Negative for adenopathy. Does not bruise/bleed easily. Psychiatric/Behavioral: Negative for confusion and sleep disturbance. The patient is not nervous/anxious. Objective:   Physical Exam   Constitutional: He is oriented to person, place, and time. He appears well-developed and well-nourished. No distress. HENT:   Head: Normocephalic. Mouth/Throat: Oropharynx is clear and moist. No oropharyngeal exudate. Eyes: Pupils are equal, round, and reactive to light. EOM are normal. Right eye exhibits no discharge. Left eye exhibits no discharge. No scleral icterus. Neck: Normal range of motion. Neck supple. No JVD present. No tracheal deviation present. No thyromegaly present. Cardiovascular: Normal rate and normal heart sounds. Exam reveals no gallop and no friction rub. No murmur heard. Pulmonary/Chest: Effort normal and breath sounds normal. No stridor. No respiratory distress. He has no wheezes. He has no rales. He exhibits no tenderness. Abdominal: Soft. Bowel sounds are normal. He exhibits no distension and no mass. There is no tenderness. There is no rebound. Musculoskeletal: Normal range of motion. He exhibits no edema. Good range of motion in all four extremities. Lymphadenopathy:     He has no cervical adenopathy. Neurological: He is alert and oriented to person, place, and time. He has normal reflexes. No cranial nerve deficit. He exhibits normal muscle tone. Skin: Skin is warm. No rash noted. No erythema. Psychiatric: He has a normal mood and affect. His behavior is normal. Judgment and thought content normal.   Vitals reviewed. /78 (Site: Left Upper Arm, Position: Sitting, Cuff Size: Large Adult)   Pulse 85   Temp 97.1 °F (36.2 °C) (Oral)   Resp 18   Ht 6' 3\" (1.905 m)   Wt (!) 308 lb 3.2 oz (139.8 kg)   SpO2 98%   BMI 38.52 kg/m²      ECOG status is 0. Imaging studies and labs:   No results found.   Lab Results   Component Value Date

## 2018-11-18 LAB — MISC. #1 REFERENCE GROUP TEST: NORMAL

## 2018-11-27 ENCOUNTER — HOSPITAL ENCOUNTER (OUTPATIENT)
Dept: INFUSION THERAPY | Age: 38
Discharge: HOME | End: 2018-11-27
Payer: COMMERCIAL

## 2018-11-27 ENCOUNTER — OFFICE VISIT (OUTPATIENT)
Dept: ONCOLOGY | Age: 38
End: 2018-11-27
Payer: COMMERCIAL

## 2018-11-27 VITALS
OXYGEN SATURATION: 95 % | TEMPERATURE: 97.3 F | HEIGHT: 75 IN | BODY MASS INDEX: 38.54 KG/M2 | WEIGHT: 310 LBS | SYSTOLIC BLOOD PRESSURE: 127 MMHG | RESPIRATION RATE: 18 BRPM | HEART RATE: 75 BPM | DIASTOLIC BLOOD PRESSURE: 73 MMHG

## 2018-11-27 DIAGNOSIS — D75.1 POLYCYTHEMIA: Primary | ICD-10-CM

## 2018-11-27 PROCEDURE — G8427 DOCREV CUR MEDS BY ELIG CLIN: HCPCS | Performed by: INTERNAL MEDICINE

## 2018-11-27 PROCEDURE — 99211 OFF/OP EST MAY X REQ PHY/QHP: CPT

## 2018-11-27 PROCEDURE — G8484 FLU IMMUNIZE NO ADMIN: HCPCS | Performed by: INTERNAL MEDICINE

## 2018-11-27 PROCEDURE — 99213 OFFICE O/P EST LOW 20 MIN: CPT | Performed by: INTERNAL MEDICINE

## 2018-11-27 PROCEDURE — 1036F TOBACCO NON-USER: CPT | Performed by: INTERNAL MEDICINE

## 2018-11-27 PROCEDURE — G8417 CALC BMI ABV UP PARAM F/U: HCPCS | Performed by: INTERNAL MEDICINE

## 2018-11-27 ASSESSMENT — ENCOUNTER SYMPTOMS
COLOR CHANGE: 0
COUGH: 0
ABDOMINAL DISTENTION: 0
VOMITING: 0
CONSTIPATION: 0
CHEST TIGHTNESS: 0
SORE THROAT: 0
ABDOMINAL PAIN: 0
TROUBLE SWALLOWING: 0
RECTAL PAIN: 0
BLOOD IN STOOL: 0
BACK PAIN: 0
FACIAL SWELLING: 0
WHEEZING: 0
EYE DISCHARGE: 0
DIARRHEA: 0
NAUSEA: 0
SHORTNESS OF BREATH: 0

## 2019-01-16 ENCOUNTER — INITIAL CONSULT (OUTPATIENT)
Dept: PULMONOLOGY | Age: 39
End: 2019-01-16
Payer: COMMERCIAL

## 2019-01-16 VITALS
OXYGEN SATURATION: 98 % | WEIGHT: 315 LBS | HEART RATE: 85 BPM | BODY MASS INDEX: 39.17 KG/M2 | HEIGHT: 75 IN | DIASTOLIC BLOOD PRESSURE: 82 MMHG | SYSTOLIC BLOOD PRESSURE: 132 MMHG

## 2019-01-16 DIAGNOSIS — G47.10 HYPERSOMNIA: ICD-10-CM

## 2019-01-16 DIAGNOSIS — I10 ESSENTIAL HYPERTENSION: ICD-10-CM

## 2019-01-16 DIAGNOSIS — R06.81 APNEA: ICD-10-CM

## 2019-01-16 DIAGNOSIS — R06.83 SNORING: Primary | ICD-10-CM

## 2019-01-16 PROCEDURE — 1036F TOBACCO NON-USER: CPT | Performed by: INTERNAL MEDICINE

## 2019-01-16 PROCEDURE — G8417 CALC BMI ABV UP PARAM F/U: HCPCS | Performed by: INTERNAL MEDICINE

## 2019-01-16 PROCEDURE — G8484 FLU IMMUNIZE NO ADMIN: HCPCS | Performed by: INTERNAL MEDICINE

## 2019-01-16 PROCEDURE — 99203 OFFICE O/P NEW LOW 30 MIN: CPT | Performed by: INTERNAL MEDICINE

## 2019-01-16 PROCEDURE — G8427 DOCREV CUR MEDS BY ELIG CLIN: HCPCS | Performed by: INTERNAL MEDICINE

## 2019-01-18 ENCOUNTER — TELEPHONE (OUTPATIENT)
Dept: SLEEP CENTER | Age: 39
End: 2019-01-18

## 2019-01-29 ENCOUNTER — HOSPITAL ENCOUNTER (OUTPATIENT)
Dept: SLEEP CENTER | Age: 39
Discharge: HOME | End: 2019-01-31
Payer: COMMERCIAL

## 2019-01-29 DIAGNOSIS — I10 ESSENTIAL HYPERTENSION: ICD-10-CM

## 2019-01-29 DIAGNOSIS — R06.81 APNEA: ICD-10-CM

## 2019-01-29 DIAGNOSIS — G47.10 HYPERSOMNIA: ICD-10-CM

## 2019-01-29 DIAGNOSIS — R06.83 SNORING: ICD-10-CM

## 2019-01-29 PROCEDURE — 95810 POLYSOM 6/> YRS 4/> PARAM: CPT

## 2019-01-31 LAB — STATUS: NORMAL

## 2019-02-05 ENCOUNTER — OFFICE VISIT (OUTPATIENT)
Dept: PULMONOLOGY | Age: 39
End: 2019-02-05
Payer: COMMERCIAL

## 2019-02-05 VITALS
WEIGHT: 315 LBS | HEART RATE: 80 BPM | HEIGHT: 75 IN | OXYGEN SATURATION: 94 % | SYSTOLIC BLOOD PRESSURE: 118 MMHG | BODY MASS INDEX: 39.17 KG/M2 | DIASTOLIC BLOOD PRESSURE: 78 MMHG

## 2019-02-05 DIAGNOSIS — I10 ESSENTIAL HYPERTENSION: ICD-10-CM

## 2019-02-05 DIAGNOSIS — K21.9 GASTROESOPHAGEAL REFLUX DISEASE WITHOUT ESOPHAGITIS: ICD-10-CM

## 2019-02-05 DIAGNOSIS — R06.83 SNORING: ICD-10-CM

## 2019-02-05 DIAGNOSIS — E66.01 CLASS 3 SEVERE OBESITY DUE TO EXCESS CALORIES WITH SERIOUS COMORBIDITY AND BODY MASS INDEX (BMI) OF 40.0 TO 44.9 IN ADULT (HCC): ICD-10-CM

## 2019-02-05 DIAGNOSIS — G47.33 OSA (OBSTRUCTIVE SLEEP APNEA): Primary | ICD-10-CM

## 2019-02-05 DIAGNOSIS — D75.1 POLYCYTHEMIA: ICD-10-CM

## 2019-02-05 PROCEDURE — 99214 OFFICE O/P EST MOD 30 MIN: CPT | Performed by: NURSE PRACTITIONER

## 2019-02-05 PROCEDURE — 1036F TOBACCO NON-USER: CPT | Performed by: NURSE PRACTITIONER

## 2019-02-05 PROCEDURE — G8484 FLU IMMUNIZE NO ADMIN: HCPCS | Performed by: NURSE PRACTITIONER

## 2019-02-05 PROCEDURE — G8417 CALC BMI ABV UP PARAM F/U: HCPCS | Performed by: NURSE PRACTITIONER

## 2019-02-05 PROCEDURE — G8427 DOCREV CUR MEDS BY ELIG CLIN: HCPCS | Performed by: NURSE PRACTITIONER

## 2019-03-11 ENCOUNTER — HOSPITAL ENCOUNTER (OUTPATIENT)
Dept: SLEEP CENTER | Age: 39
Discharge: HOME | End: 2019-03-13
Payer: COMMERCIAL

## 2019-03-11 DIAGNOSIS — G47.33 OSA (OBSTRUCTIVE SLEEP APNEA): ICD-10-CM

## 2019-03-11 PROCEDURE — 95811 POLYSOM 6/>YRS CPAP 4/> PARM: CPT

## 2019-03-12 DIAGNOSIS — Z99.89 OSA ON CPAP: Primary | ICD-10-CM

## 2019-03-12 DIAGNOSIS — G47.33 OSA ON CPAP: Primary | ICD-10-CM

## 2019-03-12 LAB — STATUS: NORMAL

## 2019-03-13 ENCOUNTER — TELEPHONE (OUTPATIENT)
Dept: SLEEP CENTER | Age: 39
End: 2019-03-13

## 2019-05-16 ENCOUNTER — OFFICE VISIT (OUTPATIENT)
Dept: PULMONOLOGY | Age: 39
End: 2019-05-16
Payer: COMMERCIAL

## 2019-05-16 VITALS
SYSTOLIC BLOOD PRESSURE: 120 MMHG | HEART RATE: 69 BPM | WEIGHT: 315 LBS | HEIGHT: 75 IN | DIASTOLIC BLOOD PRESSURE: 84 MMHG | BODY MASS INDEX: 39.17 KG/M2 | RESPIRATION RATE: 14 BRPM | OXYGEN SATURATION: 98 %

## 2019-05-16 DIAGNOSIS — Z99.89 OSA ON CPAP: Primary | ICD-10-CM

## 2019-05-16 DIAGNOSIS — G47.33 OSA ON CPAP: Primary | ICD-10-CM

## 2019-05-16 PROCEDURE — G8417 CALC BMI ABV UP PARAM F/U: HCPCS | Performed by: NURSE PRACTITIONER

## 2019-05-16 PROCEDURE — 99213 OFFICE O/P EST LOW 20 MIN: CPT | Performed by: NURSE PRACTITIONER

## 2019-05-16 PROCEDURE — 1036F TOBACCO NON-USER: CPT | Performed by: NURSE PRACTITIONER

## 2019-05-16 PROCEDURE — G8427 DOCREV CUR MEDS BY ELIG CLIN: HCPCS | Performed by: NURSE PRACTITIONER

## 2019-05-16 NOTE — PROGRESS NOTES
(Site: Left Upper Arm, Position: Sitting, Cuff Size: Medium Adult)   Pulse 69   Resp 14   Ht 6' 3\" (1.905 m)   Wt (!) 325 lb (147.4 kg)   SpO2 98% Comment: on RA  BMI 40.62 kg/m²       General Appearance Moderately built, moderately nourished, in no acute distress. HEENT - Head is normocephalic, atraumatic. PERRL. Oral mucosa pink and moist, no oral thrush. Mallampati Score - IV (only hard palate visible). Neck - Supple, symmetrical, trachea midline and soft. Lungs - Chest symmetric with normal A/P diameter, normal respiratory rate and rhythm, lungs clear to auscultation, no wheezes, rales or rhonchi, aeration good. Cardiovascular - Heart sounds are normal. Regular rhythm normal rate without murmur, gallop or rub. Abdomen - Soft, nontender, non-distended. Neurologic - Alert and oriented x 3. Skin - No bruising or bleeding. Extremities - No cyanosis, clubbing or edema. ASSESSMENT/DIAGNOSIS     Diagnosis Orders   1. DAYNA on CPAP              Plan   Do you need any equipment today? No.    - He  was advised to continue current positive airway pressure therapy with above described pressure. - He  advised to keep goodcompliance with current recommended pressure to get optimal results and clinical improvement.  - Recommend 7-9 hours of sleep with PAP treatment. - He was advised to call [employer] regarding supplies if needed.   -He call my office for earlier appointment if needed for worsening of sleep symptoms.   - He was instructed on weight loss. - Yoselin Wynne was educated about my impression and plan. Patient verbalizes understanding. We will see Yoselin Cummings back in: 6 months with download.     Electronically signed by QIANA Michaud CNP on 5/16/2019 at 11:23 AM

## 2019-06-04 DIAGNOSIS — I89.0 LYMPHEDEMA OF LEFT LEG: ICD-10-CM

## 2019-06-05 RX ORDER — POTASSIUM CHLORIDE 20 MEQ/1
20 TABLET, EXTENDED RELEASE ORAL DAILY PRN
Qty: 30 TABLET | Refills: 0 | Status: SHIPPED | OUTPATIENT
Start: 2019-06-05 | End: 2020-01-07 | Stop reason: SDUPTHER

## 2019-06-05 RX ORDER — FUROSEMIDE 40 MG/1
40 TABLET ORAL DAILY PRN
Qty: 30 TABLET | Refills: 0 | Status: SHIPPED | OUTPATIENT
Start: 2019-06-05 | End: 2020-01-07 | Stop reason: SDUPTHER

## 2019-06-14 LAB
ANION GAP SERPL CALCULATED.3IONS-SCNC: 9 MEQ/L (ref 8–16)
AVERAGE GLUCOSE: 147 MG/DL (ref 70–126)
GFR SERPL CREATININE-BSD FRML MDRD: 74 ML/MIN/1.73M2
HBA1C MFR BLD: 6.9 % (ref 4.4–6.4)

## 2019-06-18 ENCOUNTER — OFFICE VISIT (OUTPATIENT)
Dept: FAMILY MEDICINE CLINIC | Age: 39
End: 2019-06-18
Payer: COMMERCIAL

## 2019-06-18 VITALS
WEIGHT: 315 LBS | BODY MASS INDEX: 39.97 KG/M2 | DIASTOLIC BLOOD PRESSURE: 72 MMHG | RESPIRATION RATE: 14 BRPM | TEMPERATURE: 97.6 F | SYSTOLIC BLOOD PRESSURE: 128 MMHG | HEART RATE: 88 BPM

## 2019-06-18 DIAGNOSIS — E88.09 HYPOALBUMINEMIA: ICD-10-CM

## 2019-06-18 DIAGNOSIS — J30.1 ALLERGIC RHINITIS DUE TO POLLEN, UNSPECIFIED SEASONALITY: ICD-10-CM

## 2019-06-18 DIAGNOSIS — Z00.00 WELL ADULT EXAM: Primary | ICD-10-CM

## 2019-06-18 DIAGNOSIS — I89.0 LYMPHEDEMA OF LEFT LEG: ICD-10-CM

## 2019-06-18 DIAGNOSIS — R73.01 IFG (IMPAIRED FASTING GLUCOSE): ICD-10-CM

## 2019-06-18 DIAGNOSIS — I10 ESSENTIAL HYPERTENSION: ICD-10-CM

## 2019-06-18 DIAGNOSIS — E78.6 HDL DEFICIENCY: ICD-10-CM

## 2019-06-18 DIAGNOSIS — D68.51 FACTOR 5 LEIDEN MUTATION, HETEROZYGOUS (HCC): ICD-10-CM

## 2019-06-18 DIAGNOSIS — K21.9 GASTROESOPHAGEAL REFLUX DISEASE WITHOUT ESOPHAGITIS: ICD-10-CM

## 2019-06-18 PROCEDURE — 99396 PREV VISIT EST AGE 40-64: CPT | Performed by: FAMILY MEDICINE

## 2019-06-18 RX ORDER — FLUTICASONE PROPIONATE 50 MCG
SPRAY, SUSPENSION (ML) NASAL
Qty: 3 BOTTLE | Refills: 3 | Status: SHIPPED | OUTPATIENT
Start: 2019-06-18 | End: 2020-06-12

## 2019-06-18 RX ORDER — LISINOPRIL AND HYDROCHLOROTHIAZIDE 12.5; 1 MG/1; MG/1
TABLET ORAL
Qty: 90 TABLET | Refills: 3 | Status: SHIPPED | OUTPATIENT
Start: 2019-06-18 | End: 2020-04-27 | Stop reason: SDUPTHER

## 2019-06-18 ASSESSMENT — ENCOUNTER SYMPTOMS
BLOOD IN STOOL: 0
SHORTNESS OF BREATH: 0
DIARRHEA: 0
ANAL BLEEDING: 0
CHEST TIGHTNESS: 0
ABDOMINAL PAIN: 0
CONSTIPATION: 0
VOMITING: 0
NAUSEA: 0

## 2019-06-18 ASSESSMENT — PATIENT HEALTH QUESTIONNAIRE - PHQ9
SUM OF ALL RESPONSES TO PHQ9 QUESTIONS 1 & 2: 0
SUM OF ALL RESPONSES TO PHQ QUESTIONS 1-9: 0
1. LITTLE INTEREST OR PLEASURE IN DOING THINGS: 0
2. FEELING DOWN, DEPRESSED OR HOPELESS: 0
SUM OF ALL RESPONSES TO PHQ QUESTIONS 1-9: 0

## 2019-06-18 NOTE — PATIENT INSTRUCTIONS
Encouraged annual FLU VACCINE after October 1st.  Encouraged TETANUS SHOT (TdaP/ ADACEL/ [address]) per employee health/ personal pharmacy. Consider Pneumovax 23 due to elevated HGA1C- pt would like to hold at this time. After discussion with pt, will refer to [facility]  Lymphedema Clinic at this time. Continue lower extremity compression stockings and Lasix/ Potassium PRN at this time. Will refer to Nephrology (Dr. Herb Bolanos) for evaluation of lower extremity edema and hypoalbuminemia. After discussion with pt, will hold on referral to [facility], as well as check ing sugars daily (per pt preference) and instead, work diligently on diet, exercise, and weight loss over the next 3 months and recheck labs accordingly in 3 months. Check HGA1C, CMP, and FREE T4/ TSH in 3 months. Home BP's- call if > 140/90 on a regular basis. Continue current meds  Refills     Follow up in 3 months.

## 2019-06-18 NOTE — PROGRESS NOTES
[facility]  [facility]  Dept: [phone]  Dept Fax: [phone]  JENNIFER Bruno is a 37 y.o.male    Pt presents for annual wellness physical exam.      Weight increased 8 # over past 9 months. Pt states he gained weight after he quit chewing. The home BP readings have been in the 110-120's / 70's range. Checking monthly     Pt complains of worsening Lymphedema over past 6 months- gradual in nature. Worse recently while on fishing trip with extended driving. Pt stable since last visit- no new problems    Patient Active Problem List   Diagnosis    HDL deficiency    Lymphedema of left leg- 1995- due to recurrent cellulitis    Essential hypertension    Gastroesophageal reflux disease without esophagitis    Allergic rhinitis due to pollen    Factor 5 Leiden mutation, heterozygous ([address]     Review of Systems   Constitutional: Negative for chills, diaphoresis, fatigue, fever and unexpected weight change. Eyes: Negative for visual disturbance. Respiratory: Negative for chest tightness and shortness of breath. Cardiovascular: Positive for leg swelling. Negative for chest pain and palpitations. Gastrointestinal: Negative for abdominal pain, anal bleeding, blood in stool, constipation, diarrhea, nausea and vomiting. Genitourinary: Negative for dysuria and hematuria. Musculoskeletal: Negative for neck pain. Neurological: Negative for dizziness, light-headedness and headaches. OBJECTIVE     /72   Pulse 88   Temp 97.6 °F (36.4 °C) (Oral)   Resp 14   Wt (!) 319 lb 12.8 oz (145.1 kg)   BMI 39.97 kg/m²     Wt Readings from Last 3 Encounters:   06/18/19 (!) 319 lb 12.8 oz (145.1 kg)   05/16/19 (!) 325 lb (147.4 kg)   02/05/19 (!) 322 lb 9.6 oz (146.3 kg)       Physical Exam   Constitutional: He is oriented to person, place, and time. He appears well-developed and well-nourished. HENT:   Head: Normocephalic and atraumatic.    Right Ear: External ear normal.   Left Ear: External ear normal.   Nose: Nose normal.   Mouth/Throat: Oropharynx is clear and moist.   Eyes: Pupils are equal, round, and reactive to light. Conjunctivae and EOM are normal.   Neck: Normal range of motion. Neck supple. Cardiovascular: Normal rate, regular rhythm and intact distal pulses. Pulmonary/Chest: Effort normal and breath sounds normal.   Abdominal: Soft. Bowel sounds are normal.   Genitourinary:   Genitourinary Comments: MASSIEL deferred today as pt currently asymptomatic. Pt denies dysuria, hematuria, frequency, urgency, difficulty starting/ stopping stream, frequent nocturia    Will reconsider annually. ES     Musculoskeletal: Normal range of motion. Neurological: He is alert and oriented to person, place, and time. He has normal reflexes. Skin: Skin is warm and dry. Psychiatric: He has a normal mood and affect. His behavior is normal. Judgment and thought content normal.   Nursing note and vitals reviewed.     Component      Latest Ref Rng & Units 6/14/2019   WBC      4.8 - 10.8 thou/mm3 5.4   RBC      4.70 - 6.10 mill/mm3 5.76   Hemoglobin Quant      14.0 - 18.0 gm/dl 17.5   Hematocrit      42.0 - 52.0 % 51.6   MCV      80.0 - 94.0 fL 89.6   MCH      26.0 - 33.0 pg 30.4   MCHC      32.2 - 35.5 gm/dl 33.9   RDW-CV      11.5 - 14.5 % 12.8   RDW-SD      35.0 - 45.0 fL 41.9   Platelet Count      712 - 400 thou/mm3 201   MPV      9.4 - 12.4 fL 10.9   Seg Neutrophils      % 75.0   Lymphocytes      % 10.6   Monocytes      % 10.4   Eosinophils      % 3.0   Basophils      % 0.4   Immature Granulocytes      % 0.6   Segs Absolute      1.8 - 7.7 thou/mm3 4.1   Lymphocytes #      1.0 - 4.8 thou/mm3 0.6 (L)   Monocytes #      0.4 - 1.3 thou/mm3 0.6   Eosinophils #      0.0 - 0.4 thou/mm3 0.2   Basophils #      0.0 - 0.1 thou/mm3 0.0   Immature Grans (Abs)      0.00 - 0.07 thou/mm3 0.03   Nucleated Red Blood Cells      /100 wbc 0   Glucose      70 - 108 mg/dL 129 (H) Creatinine      0.4 - 1.2 mg/dL 1.1   BUN      7 - 22 mg/dL 18   Sodium      135 - 145 meq/L 138   Potassium      3.5 - 5.2 meq/L 4.1   Chloride      98 - 111 meq/L 101   CO2      23 - 33 meq/L 28   Calcium      8.5 - 10.5 mg/dL 8.1 (L)   AST      5 - 40 U/L 28   Alk Phos      38 - 126 U/L 48   Total Protein      6.1 - 8.0 g/dL 5.1 (L)   Albumin      3.5 - 5.1 g/dL 3.0 (L)   Bilirubin      0.3 - 1.2 mg/dL 0.4   ALT      11 - 66 U/L 45   Cholesterol, Total      100 - 199 mg/dL 115   Triglycerides      0 - 199 mg/dL 49   HDL Cholesterol      mg/dL 39   LDL Calculated      mg/dL 66   Hemoglobin A1C      4.4 - 6.4 % 6.9 (H)   AVERAGE GLUCOSE      70 - 126 mg/dL 147 (H)   Anion Gap      8.0 - 16.0 meq/L 9.0   Est, Glom Filt Rate      ml/min/1.73m2 74 (A)       Lab Results   Component Value Date    CHOL 115 06/14/2019    TRIG 49 06/14/2019    HDL 39 06/14/2019    LDLCALC 66 06/14/2019       Lab Results   Component Value Date     06/14/2019    K 4.1 06/14/2019     06/14/2019    CO2 28 06/14/2019    BUN 18 06/14/2019    CREATININE 1.1 06/14/2019    GLUCOSE 129 (H) 06/14/2019    CALCIUM 8.1 (L) 06/14/2019    PROT 5.1 (L) 06/14/2019    LABALBU 3.0 (L) 06/14/2019    BILITOT 0.4 06/14/2019    ALKPHOS 48 06/14/2019    AST 28 06/14/2019    ALT 45 06/14/2019    LABGLOM 74 (A) 06/14/2019     Lab Results   Component Value Date    TSH 3.03 03/15/2018    T4FREE 1.05 03/15/2018         Immunization History   Administered Date(s) Administered    Influenza 11/01/2015    Influenza Vaccine, unspecified formulation 11/01/2016    Influenza Virus Vaccine 10/27/2017, 10/16/2018    Tdap (Boostrix, Adacel) 01/01/2010         Health Maintenance   Topic Date Due    Pneumococcal 0-64 years Vaccine (1 of 1 - PPSV23) 03/28/1984    Diabetic foot exam  03/28/1988    Diabetic retinal exam  03/28/1988    Diabetic microalbuminuria test  03/28/1996    Hepatitis B Vaccine (1 of 3 - Risk 3-dose series) 03/28/1997    DTaP/Tdap/Td vaccine (2 - Td) 01/01/2020    A1C test (Diabetic or Prediabetic)  06/14/2020    Lipid screen  06/14/2020    Potassium monitoring  06/14/2020    Creatinine monitoring  06/14/2020    Flu vaccine  Completed    HIV screen  Discontinued     AAA ultrasound (Male, 65-75, smoked ever) indicated at this time? NO tobacco history  CT Lung Screen (55-80, 30 pk-yrs, smoking or quit <15 years) indicated at this time? NO tobacco history    Future Appointments   Date Time Provider Yanira Yost   11/18/2019  3:00 PM Lady Benson, APRN - CNP [facility]         ASSESSMENT       Diagnosis Orders   1. Well adult exam     2. IFG (impaired fasting glucose)  Hemoglobin A1C   3. Essential hypertension  lisinopril-hydrochlorothiazide (PRINZIDE;ZESTORETIC) 10-12.5 MG per tablet    Comprehensive Metabolic Panel    T4, Free    TSH without Reflex   4. HDL deficiency     5. Hypoalbuminemia  Nayan Mane MD, Nephrology, HOSPITAL [address]   6. Lymphedema of left leg- 1995- due to recurrent cellulitis  Iain Garcia MD, Nephrology, [address]   7. Gastroesophageal reflux disease without esophagitis     8. Allergic rhinitis due to pollen, unspecified seasonality  fluticasone (FLONASE) 50 MCG/ACT nasal spray   9. Factor 5 Leiden mutation, heterozygous ([address]         PLAN      Encouraged annual FLU VACCINE after October 1st.  Encouraged TETANUS SHOT (TdaP/ ADACEL/ BOOSTRIX) per employee health/ personal pharmacy. Consider Pneumovax 23 due to elevated HGA1C- pt would like to hold at this time. After discussion with pt, will refer to [facility]  Lymphedema [facility] at this time. Continue lower extremity compression stockings and Lasix/ Potassium PRN at this time. Will refer to Nephrology (Dr. Anne-Marie Rushing) for evaluation of lower extremity edema and hypoalbuminemia.     After discussion with pt, will hold on referral to [facility], as well as check ing sugars daily (per pt preference) and instead, work diligently on diet, exercise, and weight loss over the next 3 months and recheck labs accordingly in 3 months. Check HGA1C, CMP, and FREE T4/ TSH in 3 months. Home BP's- call if > 140/90 on a regular basis. Continue current meds  Refills     Follow up in 3 months.      Electronically signed Kimber Mckee MD on 6/18/2019 at 4:38 PM

## 2019-06-20 ENCOUNTER — HOSPITAL ENCOUNTER (OUTPATIENT)
Dept: OCCUPATIONAL THERAPY | Age: 39
Setting detail: THERAPIES SERIES
Discharge: HOME | End: 2019-06-20
Payer: COMMERCIAL

## 2019-06-20 PROCEDURE — 97535 SELF CARE MNGMENT TRAINING: CPT

## 2019-06-20 PROCEDURE — 97165 OT EVAL LOW COMPLEX 30 MIN: CPT

## 2019-06-20 ASSESSMENT — PAIN DESCRIPTION - ORIENTATION: ORIENTATION: LEFT

## 2019-06-20 ASSESSMENT — PAIN SCALES - GENERAL: PAINLEVEL_OUTOF10: 2

## 2019-06-20 ASSESSMENT — PAIN DESCRIPTION - LOCATION: LOCATION: LEG

## 2019-06-20 NOTE — FLOWSHEET NOTE
I certify that I have examined the patient below and determined that Physical Medicine and Rehabilitation service is necessary; that the secondary diagnosis for the provision of rehabilitation services is consistent with identified needs; that service will be furnished on an outpatient basis while the patient is in my care; that I approve the above plan of care for up to 90 days or as specifically noted above and will review it within that time frame or more often if the patients condition requires. Attestation, signature or co-signature of physician indicates approval of certification requirements.    ________________________ ____________ __________  Physician Signature   Date   Time    Roxi Brenner 58  LYMPHEDEMA SERVICES EVALUATION                     Date: 2019  Patient Name: Jihan Mancini        CSN: [id number]   : 1980  (37 y.o.)  Gender: male   Referring Practitioner: Dr. Loraine Crane  Diagnosis: Left LE Lymphedema, I89.0     Additional Pertinent Hx: Pt. here today for evaluation for left LE lymphedema. Pt. has had issues with swellig of left leg since  with recurrent bouts of cellulitis. Pt. states he has worn compression stockings most of his life but has not done any formal therapy for his lymphedema.     General:  OT Visit Information  Onset Date: 19  OT Insurance Information: [employer] [place] - 40 visits of OT alone per calendar year  Total # of Visits Approved: 40  Total # of Visits to Date: 1  Progress Note Counter: 1/10  Family / Caregiver Present: No    Restrictions/Precautions:  Restrictions/Precautions: General Precautions(increased risk for infection left leg)              Subjective:  Subjective: Pt. 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 Leisure & Hobbies: Hunting and fishing    Pertinent Medical History: Active Cancer: No  CHF: No  Acute DVT: No  Diabetes: No  Kidney Problems:No    Acute/active infection (i.e.. Cellulitis):No       History of Cancer:       no history of cancer    Prior Lymphedema Treatment:  Prior treatment for Lymphedema:           Complete Decongestive Therapy/Manual Lymph Drainage:  No            Compression garments only:  Yes - knee high - pt. States he has tried thigh high but they were uncomfortable and slide down and so he prefers knee high            Sequential compression pumps:  No            OBJECTIVE    Physical Assessment:  Range of Motion: WFL   Strength: WFL   Sensation: WFL   Transfers: WFL   Ambulation: WFL     Hyperkeratosis: - Mild  Hyperplasia:  Mild   Hyperpigmentation:Mild  Papillomas: None  Lymphorrhea (Weeping): None    Lymphedema Assessment:  Pitting Edema Moderate (+2) left lower leg   Skin Appearance/Condition Mild Redness     Skin Condition Normal   Open Wound(s) No   Tissue Temperature Warm   Skin Folds None    Fibrotic Tissue Minimal    Orange Peel Texture None   Nailbed Appearance/Condition WFL    Leakage of Fluid Amount: None         Circumferential Measurements:  Refer to circumferential measurements. Circumferential Measurements: Bilateral lower extremity   Left Right   MTP's 26.5 cm 27.3 cm   Dorsum of Foot 26.5 cm 27.6 cm   Lateral Malleolus 29.2 cm 29.5 cm   10 cm up from lateral Malleolus 34.8 cm 28.3 cm   20 cm up 46.5 40.1   30 cm up 45.2 45.7   40 cm up 42.6 42.1   Knee 49.5 cm 45.7 cm         TREATMENT  Educated pt. on skin care and gave handout. Educated on lymphatic system. Educated on options for treatment and control of edema. Gave pt.  A length a decrease in swelling. X Patient Education: To train and educate the patient on becoming independent with the home management skills for this chronic condition of Lymphedema. X Garment Fitting/Assessment:  Assist with recommending and obtaining appropriate compression garments to be worn daily to keep swelling down in the involved areas. EDUCATION   New Education Provided: Educated pt. On the 4 treatment options for lymphedema. Educated on low stretch bandages vs. Ace wraps. Educated on skin care and importance of moisturizing legs with a fragrance free lotion such as Eucerin and using Aquaphor for any minor skin abrasion. Educated on possibility/availability of a compression pump for control of lymphedema. Learner:patient  Method: explanation and handout       Outcome: acknowledged understanding     GOALS:   PATIENT GOAL: To learn how to better control his lypmhedema at home and prevent it from getting any worse. SHORT-TERM GOALS:  to be met in 2 weeks   X  Patient will demonstrate a decrease in circumferential measurements of the affected extremity by .5 to 1 cm working towards the lymphedema swelling stabilizing and patient being able to get measured and fitted for a new compression garment to be worn daily to keep swelling down. X  Patient will tolerate wearing the compression bandages through the night working towards meeting short-term goal #1. X Patient will verbalize understanding of skin care precautionary measures to decrease dry skin and risk of infection. X Patient will demonstrate applying compression bandages with no greater than minimal assist and verbal cues from the therapist working towards being modified independent with applying the compression bandages for night time swelling.            LONG-TERM GOALS:  to be met in 4 weeks   X Lymphedema swelling will stabilize as noted by total circumferential changes being no greater than 3.0 cm in preparation for being measured for

## 2019-06-27 ENCOUNTER — HOSPITAL ENCOUNTER (OUTPATIENT)
Dept: OCCUPATIONAL THERAPY | Age: 39
Setting detail: THERAPIES SERIES
Discharge: HOME | End: 2019-06-27
Payer: COMMERCIAL

## 2019-06-27 PROCEDURE — 97110 THERAPEUTIC EXERCISES: CPT

## 2019-06-27 PROCEDURE — 97535 SELF CARE MNGMENT TRAINING: CPT

## 2019-06-27 NOTE — PROGRESS NOTES
unraveling in the night. Decongestive/Therapeutic Exercise  Instructed pt. On diaphragmatic breathing and then LE exercises to complete while in compression bandages    Patient Education  New Education Provided:   Educated pt. On self massage techniques, compression bandaging, exercise and gave handouts on all information. Instructed pt. To complete this routine in the evening after his shower and try to wear bandages at night. Instructed pt. To wear the bandages to his appointment next week so that we can take measurements for a new stocking while his leg is at it's smallest.   Learner:patient  Method: demonstration, explanation and handout       Outcome: acknowledged understanding      ASSESSMENT:  Assessment:  Patient progressing toward goal achievement. Activity Tolerance:  Patient tolerance of  treatment: Good.       Plan: Continue treatment according to established plan of care    Time In: 700   Time Out: 753   Timed Code Minutes: [address] Minutes: 0   Total Treatment Time: [address] [facility] [address], OTR/L [address] [facility]

## 2019-07-02 ENCOUNTER — HOSPITAL ENCOUNTER (OUTPATIENT)
Dept: OCCUPATIONAL THERAPY | Age: 39
Setting detail: THERAPIES SERIES
Discharge: HOME | End: 2019-07-02
Payer: COMMERCIAL

## 2019-07-02 PROCEDURE — 97535 SELF CARE MNGMENT TRAINING: CPT

## 2019-07-02 NOTE — PROGRESS NOTES
just after pt. Wrapping only 1 night. Recommended to pt. That with the hot temperatures outside right now, it might be beneficial to wrap his leg more consistently at night. Gave pt. Handout with specifications to order a thigh high stocking off website [email or web address]. Educated pt. On benefits of the Juzo Silver stocking. Schedule pt. For 2 weeks from today to allow time for pt. To order the stocking and for it to arrive. Pt. Had no questions regarding information from previous session. Learner:patient  Method: explanation and handout       Outcome: acknowledged understanding      ASSESSMENT:  Assessment:  Patient progressing toward goal achievement. Activity Tolerance:  Patient tolerance of  treatment: Good.       Plan: Continue treatment according to established plan of care    Time In: 710   Time Out: 740   Timed Code Minutes: 30   Untimed Code Minutes: 0   Total Treatment Time: [address], OTR/L [id number] [facility]

## 2019-07-16 ENCOUNTER — HOSPITAL ENCOUNTER (OUTPATIENT)
Dept: OCCUPATIONAL THERAPY | Age: 39
Setting detail: THERAPIES SERIES
Discharge: HOME | End: 2019-07-16
Payer: COMMERCIAL

## 2019-07-16 PROCEDURE — 97535 SELF CARE MNGMENT TRAINING: CPT

## 2019-07-30 ENCOUNTER — OFFICE VISIT (OUTPATIENT)
Dept: NEPHROLOGY | Age: 39
End: 2019-07-30
Payer: COMMERCIAL

## 2019-07-30 VITALS
WEIGHT: 315 LBS | SYSTOLIC BLOOD PRESSURE: 129 MMHG | BODY MASS INDEX: 39.75 KG/M2 | OXYGEN SATURATION: 98 % | HEART RATE: 75 BPM | DIASTOLIC BLOOD PRESSURE: 83 MMHG

## 2019-07-30 DIAGNOSIS — D68.51 FACTOR 5 LEIDEN MUTATION, HETEROZYGOUS (HCC): ICD-10-CM

## 2019-07-30 DIAGNOSIS — E88.09 HYPOALBUMINEMIA: Primary | ICD-10-CM

## 2019-07-30 DIAGNOSIS — I10 ESSENTIAL HYPERTENSION: ICD-10-CM

## 2019-07-30 PROCEDURE — 99203 OFFICE O/P NEW LOW 30 MIN: CPT | Performed by: INTERNAL MEDICINE

## 2019-07-30 PROCEDURE — 1036F TOBACCO NON-USER: CPT | Performed by: INTERNAL MEDICINE

## 2019-07-30 PROCEDURE — G8417 CALC BMI ABV UP PARAM F/U: HCPCS | Performed by: INTERNAL MEDICINE

## 2019-07-30 PROCEDURE — G8427 DOCREV CUR MEDS BY ELIG CLIN: HCPCS | Performed by: INTERNAL MEDICINE

## 2019-08-02 ENCOUNTER — HOSPITAL ENCOUNTER (OUTPATIENT)
Dept: ULTRASOUND IMAGING | Age: 39
Discharge: HOME | End: 2019-08-02
Payer: COMMERCIAL

## 2019-08-02 DIAGNOSIS — E88.09 HYPOALBUMINEMIA: ICD-10-CM

## 2019-08-02 PROCEDURE — 76770 US EXAM ABDO BACK WALL COMP: CPT

## 2019-08-21 ENCOUNTER — NURSE ONLY (OUTPATIENT)
Dept: LAB | Age: 39
End: 2019-08-21

## 2019-08-21 DIAGNOSIS — E88.09 HYPOALBUMINEMIA: ICD-10-CM

## 2019-08-21 LAB
HOURS COLLECTED: 24 HRS
PROTEIN 24 HOUR URINE: 132 MG/24 HR (ref 42–225)
PROTEIN, URINE: 4.4 MG/DL
URINE VOLUME: NORMAL ML

## 2019-08-23 LAB
ANA SCREEN: NORMAL
C3 COMPLEMENT: 116 MG/DL (ref 88–201)
C4 COMPLEMENT: 24 MG/DL (ref 10–40)

## 2019-08-24 LAB — KAPPA/LAMBDA FREE LIGHT CHAINS: NORMAL

## 2019-08-27 ENCOUNTER — OFFICE VISIT (OUTPATIENT)
Dept: NEPHROLOGY | Age: 39
End: 2019-08-27
Payer: COMMERCIAL

## 2019-08-27 VITALS
WEIGHT: 308 LBS | SYSTOLIC BLOOD PRESSURE: 127 MMHG | BODY MASS INDEX: 38.5 KG/M2 | DIASTOLIC BLOOD PRESSURE: 75 MMHG | HEART RATE: 61 BPM | OXYGEN SATURATION: 98 %

## 2019-08-27 DIAGNOSIS — I10 ESSENTIAL HYPERTENSION: ICD-10-CM

## 2019-08-27 DIAGNOSIS — E88.09 HYPOALBUMINEMIA: Primary | ICD-10-CM

## 2019-08-27 DIAGNOSIS — D68.51 FACTOR 5 LEIDEN MUTATION, HETEROZYGOUS (HCC): ICD-10-CM

## 2019-08-27 PROCEDURE — G8417 CALC BMI ABV UP PARAM F/U: HCPCS | Performed by: INTERNAL MEDICINE

## 2019-08-27 PROCEDURE — G8427 DOCREV CUR MEDS BY ELIG CLIN: HCPCS | Performed by: INTERNAL MEDICINE

## 2019-08-27 PROCEDURE — 99213 OFFICE O/P EST LOW 20 MIN: CPT | Performed by: INTERNAL MEDICINE

## 2019-08-27 PROCEDURE — 1036F TOBACCO NON-USER: CPT | Performed by: INTERNAL MEDICINE

## 2019-08-27 NOTE — PROGRESS NOTES
Renal Progress Note    Assessment and Plan:      Diagnosis Orders   1. Hypoalbuminemia     2. Essential hypertension     3. Factor 5 Leiden mutation, heterozygous [facility])        PLAN:   Lab results discussed with the patient. Complements are normal.  Kappa with lambda free light chain assay ratio is normal.  24-hour urine protein is normal.  Recent lipid profile is normal.  Recent TSH is normal.    Kidney ultrasound is normal.  Medications reviewed. No changes. He likely has normal variant hypoalbuminemia. We will see on as-needed basis. Discussed with the patient. Patient Active Problem List   Diagnosis    HDL deficiency    Lymphedema of left leg- 1995- due to recurrent cellulitis    Essential hypertension    Gastroesophageal reflux disease without esophagitis    Allergic rhinitis due to pollen    Factor 5 Leiden mutation, heterozygous ([facility] [address]       Subjective:   Chief complaint:  Chief Complaint   Patient presents with    1 Month Follow-Up     hypoalbuminemia      HPI:This is a follow up visit for Mr. Stef Stovall who is here today for return appointment. I saw him about 4 weeks ago for hypoalbuminemia. He is doing well today with no complaint. Work-up was initiated and  essentially completely unremarkable. No complaints. He does not see any bubbles or forms in his urine.     ROS:Constitutional: negative  Eyes: negative  Ears, nose, mouth, throat, and face: negative  Respiratory: negative  Cardiovascular: negative  Gastrointestinal: negative  Genitourinary:negative  Integument/breast: negative  Hematologic/lymphatic: negative  Musculoskeletal:negative  Neurological: negative  Behavioral/Psych: negative  Endocrine: negative  Allergic/Immunologic: negative     Medications:     Current Outpatient Medications   Medication Sig Dispense Refill    lisinopril-hydrochlorothiazide (PRINZIDE;ZESTORETIC) 10-12.5 MG per tablet take 1 tablet by mouth once daily 90 tablet 3    fluticasone (FLONASE) 50 NEGATIVE 10/01/2018    UROBILINOGEN 1.0 10/01/2018    BILIRUBINUR NEGATIVE 10/01/2018    BLOODU NEGATIVE 10/01/2018    KETUA NEGATIVE 10/01/2018      Microalbumen/Creatinine ratio:  No components found for: RUCREAT    Objective:   Vitals: /75 (Site: Right Upper Arm, Position: Sitting, Cuff Size: Large Adult)   Pulse 61   Wt (!) 308 lb (139.7 kg)   SpO2 98%   BMI 38.50 kg/m²      Constitutional:  Alert, awake, no apparent distress  Skin:normal  HEENT:Pupils are reactive . Throat is clear  Neck:supple with no thyromegally  Cardiovascular:  S1, S2 without murmur  Respiratory:  Clear  Abdomen: +bs, soft, non tender  Ext: No LE edema  Musculoskeletal:Intact  Neuro:Alert and oriented with no deficit    Electronically signed by Ismael Lopes MD on 8/27/2019 at 10:41 AM

## 2019-09-18 ENCOUNTER — NURSE ONLY (OUTPATIENT)
Dept: LAB | Age: 39
End: 2019-09-18

## 2019-09-18 DIAGNOSIS — R73.01 IFG (IMPAIRED FASTING GLUCOSE): ICD-10-CM

## 2019-09-18 DIAGNOSIS — I10 ESSENTIAL HYPERTENSION: ICD-10-CM

## 2019-09-18 LAB
ALBUMIN SERPL-MCNC: 3.4 G/DL (ref 3.5–5.1)
ALP BLD-CCNC: 47 U/L (ref 38–126)
ALT SERPL-CCNC: 38 U/L (ref 11–66)
ANION GAP SERPL CALCULATED.3IONS-SCNC: 9 MEQ/L (ref 8–16)
AST SERPL-CCNC: 24 U/L (ref 5–40)
AVERAGE GLUCOSE: 117 MG/DL (ref 70–126)
BILIRUB SERPL-MCNC: 0.5 MG/DL (ref 0.3–1.2)
BUN BLDV-MCNC: 17 MG/DL (ref 7–22)
CALCIUM SERPL-MCNC: 8.7 MG/DL (ref 8.5–10.5)
CHLORIDE BLD-SCNC: 101 MEQ/L (ref 98–111)
CO2: 30 MEQ/L (ref 23–33)
CREAT SERPL-MCNC: 1.1 MG/DL (ref 0.4–1.2)
GFR SERPL CREATININE-BSD FRML MDRD: 74 ML/MIN/1.73M2
GLUCOSE BLD-MCNC: 119 MG/DL (ref 70–108)
HBA1C MFR BLD: 5.9 % (ref 4.4–6.4)
POTASSIUM SERPL-SCNC: 4 MEQ/L (ref 3.5–5.2)
SODIUM BLD-SCNC: 140 MEQ/L (ref 135–145)
T4 FREE: 1.24 NG/DL (ref 0.93–1.76)
TOTAL PROTEIN: 5.3 G/DL (ref 6.1–8)
TSH SERPL DL<=0.05 MIU/L-ACNC: 3.42 UIU/ML (ref 0.4–4.2)

## 2019-09-23 ENCOUNTER — OFFICE VISIT (OUTPATIENT)
Dept: FAMILY MEDICINE CLINIC | Age: 39
End: 2019-09-23
Payer: COMMERCIAL

## 2019-09-23 VITALS
RESPIRATION RATE: 12 BRPM | SYSTOLIC BLOOD PRESSURE: 114 MMHG | WEIGHT: 302.6 LBS | TEMPERATURE: 98.3 F | BODY MASS INDEX: 37.82 KG/M2 | DIASTOLIC BLOOD PRESSURE: 74 MMHG | HEART RATE: 84 BPM

## 2019-09-23 DIAGNOSIS — D68.51 FACTOR 5 LEIDEN MUTATION, HETEROZYGOUS (HCC): ICD-10-CM

## 2019-09-23 DIAGNOSIS — E88.09 HYPOALBUMINEMIA: ICD-10-CM

## 2019-09-23 DIAGNOSIS — I89.0 LYMPHEDEMA OF LEFT LEG: ICD-10-CM

## 2019-09-23 DIAGNOSIS — J30.1 ALLERGIC RHINITIS DUE TO POLLEN, UNSPECIFIED SEASONALITY: ICD-10-CM

## 2019-09-23 DIAGNOSIS — K21.9 GASTROESOPHAGEAL REFLUX DISEASE WITHOUT ESOPHAGITIS: ICD-10-CM

## 2019-09-23 DIAGNOSIS — D58.2 ELEVATED HEMOGLOBIN (HCC): ICD-10-CM

## 2019-09-23 DIAGNOSIS — R73.01 IFG (IMPAIRED FASTING GLUCOSE): Primary | ICD-10-CM

## 2019-09-23 DIAGNOSIS — I10 ESSENTIAL HYPERTENSION: ICD-10-CM

## 2019-09-23 PROCEDURE — G8417 CALC BMI ABV UP PARAM F/U: HCPCS | Performed by: FAMILY MEDICINE

## 2019-09-23 PROCEDURE — 1036F TOBACCO NON-USER: CPT | Performed by: FAMILY MEDICINE

## 2019-09-23 PROCEDURE — G8427 DOCREV CUR MEDS BY ELIG CLIN: HCPCS | Performed by: FAMILY MEDICINE

## 2019-09-23 PROCEDURE — 99214 OFFICE O/P EST MOD 30 MIN: CPT | Performed by: FAMILY MEDICINE

## 2019-09-23 RX ORDER — POTASSIUM CHLORIDE 20 MEQ/1
20 TABLET, EXTENDED RELEASE ORAL DAILY PRN
Qty: 30 TABLET | Refills: 1 | Status: CANCELLED | OUTPATIENT
Start: 2019-09-23

## 2019-09-23 RX ORDER — FUROSEMIDE 40 MG/1
40 TABLET ORAL DAILY PRN
Qty: 30 TABLET | Refills: 1 | Status: CANCELLED | OUTPATIENT
Start: 2019-09-23

## 2019-09-23 ASSESSMENT — ENCOUNTER SYMPTOMS
CHEST TIGHTNESS: 0
NAUSEA: 0
VOMITING: 0
ANAL BLEEDING: 0
ABDOMINAL PAIN: 0
DIARRHEA: 0
SHORTNESS OF BREATH: 0
BLOOD IN STOOL: 0
CONSTIPATION: 0

## 2019-11-18 ENCOUNTER — OFFICE VISIT (OUTPATIENT)
Dept: PULMONOLOGY | Age: 39
End: 2019-11-18
Payer: COMMERCIAL

## 2019-11-18 VITALS
HEART RATE: 90 BPM | WEIGHT: 309.4 LBS | DIASTOLIC BLOOD PRESSURE: 88 MMHG | HEIGHT: 75 IN | SYSTOLIC BLOOD PRESSURE: 132 MMHG | BODY MASS INDEX: 38.47 KG/M2 | OXYGEN SATURATION: 95 %

## 2019-11-18 DIAGNOSIS — Z99.89 OSA ON CPAP: Primary | ICD-10-CM

## 2019-11-18 DIAGNOSIS — G47.33 OSA ON CPAP: Primary | ICD-10-CM

## 2019-11-18 PROCEDURE — 1036F TOBACCO NON-USER: CPT | Performed by: NURSE PRACTITIONER

## 2019-11-18 PROCEDURE — G8417 CALC BMI ABV UP PARAM F/U: HCPCS | Performed by: NURSE PRACTITIONER

## 2019-11-18 PROCEDURE — G8427 DOCREV CUR MEDS BY ELIG CLIN: HCPCS | Performed by: NURSE PRACTITIONER

## 2019-11-18 PROCEDURE — 99212 OFFICE O/P EST SF 10 MIN: CPT | Performed by: NURSE PRACTITIONER

## 2019-11-18 PROCEDURE — G8484 FLU IMMUNIZE NO ADMIN: HCPCS | Performed by: NURSE PRACTITIONER

## 2019-11-18 RX ORDER — OMEPRAZOLE 10 MG/1
10 CAPSULE, DELAYED RELEASE ORAL DAILY
COMMUNITY
End: 2021-10-11

## 2020-01-07 RX ORDER — FUROSEMIDE 40 MG/1
40 TABLET ORAL DAILY PRN
Qty: 30 TABLET | Refills: 0 | Status: SHIPPED | OUTPATIENT
Start: 2020-01-07 | End: 2020-07-16 | Stop reason: SDUPTHER

## 2020-01-07 RX ORDER — POTASSIUM CHLORIDE 20 MEQ/1
20 TABLET, EXTENDED RELEASE ORAL DAILY PRN
Qty: 30 TABLET | Refills: 0 | Status: SHIPPED | OUTPATIENT
Start: 2020-01-07 | End: 2020-07-16 | Stop reason: SDUPTHER

## 2020-01-07 NOTE — TELEPHONE ENCOUNTER
This medication refill is regarding a MyChart request:    Requested Prescriptions     Pending Prescriptions Disp Refills    furosemide (LASIX) 40 MG tablet 30 tablet 0     Sig: Take 1 tablet by mouth daily as needed (edema)    potassium chloride (KLOR-CON M) 20 MEQ extended release tablet 30 tablet 0     Sig: Take 1 tablet by mouth daily as needed (edema/ Lasix use)       Date of last visit: 9/23/2019  Date of next visit: 3/25/2020  Date of last refill: 6/5/19 for #30 no refill   Pharm[facility] Name: [facility]    Last CMP:   Lab Results   Component Value Date     09/18/2019    K 4.0 09/18/2019     09/18/2019    CO2 30 09/18/2019    BUN 17 09/18/2019    CREATININE 1.1 09/18/2019    GLUCOSE 119 (H) 09/18/2019    CALCIUM 8.7 09/18/2019    PROT 5.3 (L) 09/18/2019    LABALBU 3.4 (L) 09/18/2019    BILITOT 0.5 09/18/2019    ALKPHOS 47 09/18/2019    AST 24 09/18/2019    ALT 38 09/18/2019    LABGLOM 74 (A) 09/18/2019       Rx's verified, ordered and set to EP.

## 2020-04-28 RX ORDER — LISINOPRIL AND HYDROCHLOROTHIAZIDE 12.5; 1 MG/1; MG/1
TABLET ORAL
Qty: 90 TABLET | Refills: 1 | Status: SHIPPED | OUTPATIENT
Start: 2020-04-28 | End: 2020-12-28

## 2020-06-05 ENCOUNTER — TELEMEDICINE (OUTPATIENT)
Dept: FAMILY MEDICINE CLINIC | Age: 40
End: 2020-06-05
Payer: COMMERCIAL

## 2020-06-05 PROCEDURE — 99213 OFFICE O/P EST LOW 20 MIN: CPT | Performed by: FAMILY MEDICINE

## 2020-06-05 PROCEDURE — G8427 DOCREV CUR MEDS BY ELIG CLIN: HCPCS | Performed by: FAMILY MEDICINE

## 2020-06-05 ASSESSMENT — ENCOUNTER SYMPTOMS
NAUSEA: 0
DIARRHEA: 0
CONSTIPATION: 0
BLOOD IN STOOL: 0
ANAL BLEEDING: 0
SHORTNESS OF BREATH: 0
ABDOMINAL PAIN: 0
VOMITING: 0
CHEST TIGHTNESS: 0

## 2020-06-05 NOTE — PROGRESS NOTES
delayed release capsule Take 10 mg by mouth daily      fluticasone (FLONASE) 50 MCG/ACT nasal spray Instill 2 sprays into each nostril once daily 3 Bottle 3    Probiotic Product (PROBIOTIC DAILY PO) Take 1 capsule by mouth       No current facility-administered medications for this visit. Review of Systems   Constitutional: Negative for chills, diaphoresis, fatigue, fever and unexpected weight change. Eyes: Negative for visual disturbance. Respiratory: Negative for chest tightness and shortness of breath. Cardiovascular: Negative for chest pain, palpitations and leg swelling. Gastrointestinal: Negative for abdominal pain, anal bleeding, blood in stool, constipation, diarrhea, nausea and vomiting. Genitourinary: Negative for dysuria and hematuria. Musculoskeletal: Negative for neck pain. Neurological: Negative for dizziness, light-headedness and headaches. OBJECTIVE     Due to this being a TeleHealth encounter, evaluation of the following organ systems is limited: Vitals/Constitutional/EENT/Resp/CV/GI//MS/Neuro/Skin/Heme-Lymph-Imm. PHYSICAL EXAMINATION:  [ INSTRUCTIONS:  \"[x]\" Indicates a positive item  \"[]\" Indicates a negative item  -- DELETE ALL ITEMS NOT EXAMINED]  Vital Signs: (All Vital Signs are pt reported, unless otherwise noted)   There were no vitals taken for this visit. Constitutional: [x] Appears well-developed and well-nourished [x] No apparent distress      [] Abnormal-   Mental status  [x] Alert and awake  [x] Oriented to person/place/time [x]Able to follow commands      Eyes:  EOM    [x]  Normal  [] Abnormal-  Sclera  [x]  Normal  [] Abnormal -         Discharge [x]  None visible  [] Abnormal -    HENT:   [x] Normocephalic, atraumatic.   [] Abnormal   [] Mouth/Throat: Mucous membranes are moist.     External Ears [x] Normal  [] Abnormal-     Neck: [x] No visualized mass     Pulmonary/Chest: [x] Respiratory effort normal.  [x] No visualized signs of difficulty breathing or respiratory distress        [] Abnormal-      Musculoskeletal:   [] Normal gait with no signs of ataxia         [x] Normal range of motion of neck        [] Abnormal-       Neurological:        [x] No Facial Asymmetry (Cranial nerve 7 motor function) (limited exam to video visit)          [x] No gaze palsy        [] Abnormal-         Skin:        [x] No significant exanthematous lesions or discoloration noted on facial skin         [] Abnormal-            Psychiatric:       [x] Normal Affect [] No Hallucinations        [] Abnormal-       1.5 x 1.5 cm area of edema per pt estimate with 1 x 1 cm area whitish plaque.         Lab Results   Component Value Date     09/18/2019    K 4.0 09/18/2019     09/18/2019    CO2 30 09/18/2019    BUN 17 09/18/2019    CREATININE 1.1 09/18/2019    GLUCOSE 119 (H) 09/18/2019    CALCIUM 8.7 09/18/2019    PROT 5.3 (L) 09/18/2019    LABALBU 3.4 (L) 09/18/2019    BILITOT 0.5 09/18/2019    ALKPHOS 47 09/18/2019    AST 24 09/18/2019    ALT 38 09/18/2019    LABGLOM 74 (A) 09/18/2019     Immunization History   Administered Date(s) Administered    Influenza 11/01/2015    Influenza Vaccine, unspecified formulation 11/01/2016    Influenza Virus Vaccine 10/27/2017, 10/16/2018    Tdap (Boostrix, Adacel) 01/01/2010       Health Maintenance   Topic Date Due    DTaP/Tdap/Td vaccine (2 - Td) 01/01/2020    Flu vaccine (Season Ended) 09/01/2020    A1C test (Diabetic or Prediabetic)  09/18/2020    Potassium monitoring  09/18/2020    Creatinine monitoring  09/18/2020    Lipid screen  06/14/2024    Hepatitis A vaccine  Aged Out    Hepatitis B vaccine  Aged Out    Hib vaccine  Aged Out    Meningococcal (ACWY) vaccine  Aged Out    Pneumococcal 0-64 years Vaccine  Aged Out    HIV screen  Discontinued       Future Appointments   Date Time Provider Yanira Yost   9/24/2020  8:15 AM Pito Keen MD [address]   11/16/2020  3:00 PM QIANA Guzman - CNP

## 2020-06-05 NOTE — PATIENT INSTRUCTIONS
Cut out all tomatoes and try to avoid any sour candy at this time. Will hold on Magic Mouthwash at this time per pt preference. Will refer to Dr. Mian Blake or Dr. Kath Aguilar for further evaluation and management/ possible biopsy. Further management pending evaluation per Oral Surgery.

## 2020-06-12 RX ORDER — FLUTICASONE PROPIONATE 50 MCG
SPRAY, SUSPENSION (ML) NASAL
Qty: 3 BOTTLE | Refills: 3 | Status: SHIPPED | OUTPATIENT
Start: 2020-06-12 | End: 2021-01-18 | Stop reason: SDUPTHER

## 2020-07-17 RX ORDER — FUROSEMIDE 40 MG/1
40 TABLET ORAL DAILY PRN
Qty: 30 TABLET | Refills: 0 | Status: SHIPPED | OUTPATIENT
Start: 2020-07-17 | End: 2021-01-18 | Stop reason: SDUPTHER

## 2020-07-17 RX ORDER — POTASSIUM CHLORIDE 20 MEQ/1
20 TABLET, EXTENDED RELEASE ORAL DAILY PRN
Qty: 30 TABLET | Refills: 0 | Status: SHIPPED | OUTPATIENT
Start: 2020-07-17 | End: 2021-01-18 | Stop reason: SDUPTHER

## 2020-12-28 RX ORDER — LISINOPRIL AND HYDROCHLOROTHIAZIDE 12.5; 1 MG/1; MG/1
TABLET ORAL
Qty: 90 TABLET | Refills: 3 | Status: SHIPPED | OUTPATIENT
Start: 2020-12-28 | End: 2021-12-03 | Stop reason: SDUPTHER

## 2021-01-05 DIAGNOSIS — I10 ESSENTIAL HYPERTENSION: ICD-10-CM

## 2021-01-05 RX ORDER — LISINOPRIL AND HYDROCHLOROTHIAZIDE 12.5; 1 MG/1; MG/1
TABLET ORAL
Qty: 90 TABLET | Refills: 3 | OUTPATIENT
Start: 2021-01-05

## 2021-01-07 ENCOUNTER — OFFICE VISIT (OUTPATIENT)
Dept: PULMONOLOGY | Age: 41
End: 2021-01-07
Payer: COMMERCIAL

## 2021-01-07 VITALS
BODY MASS INDEX: 39.17 KG/M2 | WEIGHT: 315 LBS | SYSTOLIC BLOOD PRESSURE: 124 MMHG | HEART RATE: 72 BPM | HEIGHT: 75 IN | OXYGEN SATURATION: 98 % | TEMPERATURE: 97.2 F | DIASTOLIC BLOOD PRESSURE: 72 MMHG

## 2021-01-07 DIAGNOSIS — Z99.89 OSA ON CPAP: Primary | ICD-10-CM

## 2021-01-07 DIAGNOSIS — G47.33 OSA ON CPAP: Primary | ICD-10-CM

## 2021-01-07 DIAGNOSIS — J34.89 NASAL SORE: ICD-10-CM

## 2021-01-07 PROCEDURE — G8484 FLU IMMUNIZE NO ADMIN: HCPCS | Performed by: NURSE PRACTITIONER

## 2021-01-07 PROCEDURE — 99213 OFFICE O/P EST LOW 20 MIN: CPT | Performed by: NURSE PRACTITIONER

## 2021-01-07 PROCEDURE — G8427 DOCREV CUR MEDS BY ELIG CLIN: HCPCS | Performed by: NURSE PRACTITIONER

## 2021-01-07 PROCEDURE — G8417 CALC BMI ABV UP PARAM F/U: HCPCS | Performed by: NURSE PRACTITIONER

## 2021-01-07 PROCEDURE — 1036F TOBACCO NON-USER: CPT | Performed by: NURSE PRACTITIONER

## 2021-01-07 RX ORDER — CEPHALEXIN 500 MG/1
500 CAPSULE ORAL 2 TIMES DAILY
Qty: 10 CAPSULE | Refills: 0 | Status: SHIPPED | OUTPATIENT
Start: 2021-01-07 | End: 2021-01-12

## 2021-01-07 NOTE — PROGRESS NOTES
 GERD (gastroesophageal reflux disease)     Hypertension     Lymphedema     Obesity     DAYNA (obstructive sleep apnea)     Post-nasal drip        Past Surgical History:   Procedure Laterality Date    CHOLECYSTECTOMY      lap cholecystectomy approx 2009    HYDROCELE EXCISION      TONSILLECTOMY AND ADENOIDECTOMY      TYMPANOSTOMY TUBE PLACEMENT      three times       Social History     Tobacco Use    Smoking status: Never Smoker    Smokeless tobacco: Former User    Tobacco comment: 2 cans of smokeless tobacco per week   Substance Use Topics    Alcohol use: Yes     Alcohol/week: 12.0 standard drinks     Types: 12 Cans of beer per week     Comment: social     Drug use: No       No Known Allergies    Current Outpatient Medications   Medication Sig Dispense Refill    cephALEXin (KEFLEX) 500 MG capsule Take 1 capsule by mouth 2 times daily for 5 days 10 capsule 0    lisinopril-hydroCHLOROthiazide (PRINZIDE;ZESTORETIC) 10-12.5 MG per tablet TAKE 1 TABLET DAILY 90 tablet 3    furosemide (LASIX) 40 MG tablet Take 1 tablet by mouth daily as needed (edema) 30 tablet 0    potassium chloride (KLOR-CON M) 20 MEQ extended release tablet Take 1 tablet by mouth daily as needed (edema/ Lasix use) 30 tablet 0    fluticasone (FLONASE) 50 MCG/ACT nasal spray USE 2 SPRAYS IN EACH NOSTRIL ONCE DAILY 3 Bottle 3    omeprazole (PRILOSEC) 10 MG delayed release capsule Take 10 mg by mouth daily      Probiotic Product (PROBIOTIC DAILY PO) Take 1 capsule by mouth       No current facility-administered medications for this visit. Family History   Problem Relation Age of Onset    Heart Disease Father         Valvular heart disease (s/p AVR)        Review of Systems   General/Constitutional: No recent loss of weight or appetite changes. No fever or chills. HENT: Negative. Eyes: Negative. Upper respiratory tract: No nasal stuffiness or post nasal drip. Lower respiratory tract/ lungs: No cough or sputum production. No hemoptysis. Cardiovascular: No palpitations or chest pain. Gastrointestinal: No nausea or vomiting. Neurological: No focal neurologiacal weakness. Extremities: No edema. Musculoskeletal: No complaints. Genitourinary: No complaints. Hematological: Negative. Psychiatric/Behavioral: Negative. Skin: No itching. Physical Exam:    BMI: Body mass index is 40.2 kg/m². Wt Readings from Last 3 Encounters:   01/07/21 (!) 321 lb 9.6 oz (145.9 kg)   11/18/19 (!) 309 lb 6.4 oz (140.3 kg)   09/23/19 (!) 302 lb 9.6 oz (137.3 kg)     Weight stable / unchanged from study (gained 12 lbs over the last year)  Vitals: /72 (Site: Left Upper Arm, Position: Sitting, Cuff Size: Large Adult)   Pulse 72   Temp 97.2 °F (36.2 °C) (Temporal)   Ht 6' 3\" (1.905 m)   Wt (!) 321 lb 9.6 oz (145.9 kg)   SpO2 98% Comment: Room Air  BMI 40.20 kg/m²         General Appearance - Moderately built, moderately nourished, in no acute distress. HEENT - Head is normocephalic, atraumatic. PERRL. Oral mucosa pink and moist, no oral thrush. Left nares is erythematous, but patent, no clear sore nor drainage. Tender with erythema radiating to tip of nose and left side of nose. No significant edema. No discharge/drainiage. No fever. Mallampati Score - IV (only hard palate visible). Neck - Supple, symmetrical, trachea midline and soft. Lungs - Clear to auscultation, no wheezes, rales or rhonchi, aeration good. Cardiovascular - Heart sounds are normal. Regular rhythm normal rate without murmur, gallop or rub. Abdomen - Soft, nontender, non-distended. Neurologic - Alert and oriented x 3. Skin - No bruising or bleeding. Extremities - No cyanosis, clubbing or edema. ASSESSMENT/DIAGNOSIS     Diagnosis Orders   1. DAYNA on CPAP  DME Order for CPAP as OP   2. Nasal sore            Plan   Do you need any equipment today? Yes Rx for new supplies. -Download reviewed, doing well. -Rx for Keflex 500 BID for 5 days.  -Call if worsens.  -Replace supplies more routinely.  -Can try lubricant like KY jelly to area to avoid irritation.  -Otherwise, shown examples of alternate mask styles to consider.  -Defers changing style at this time but will talk with DME if needed. - He was advised to continue current positive airway pressure therapy with above described pressure. - He was advised to keep good compliance with current recommended pressure to get optimal results and clinical improvement.  - Recommend 7-9 hours of sleep with PAP treatment. - He was advised to call DME company regarding supplies if needed.   -He is to call my office for earlier appointment if needed for worsening of sleep symptoms.   - He was instructed on weight loss. - Ang Lanier was educated about my impression and plan and verbalizes understanding. We will see Jordi Gilbert back in: 1 year with download. 20 minutes was spent on this visit with > 50% being face to face obtaining HPI, examining patient, reviewing download, examining nose, educating and coordinating a treatment plan.     QIANA Raphael - CNP  1/7/2021 4:03 PM

## 2021-01-13 ENCOUNTER — NURSE ONLY (OUTPATIENT)
Dept: LAB | Age: 41
End: 2021-01-13

## 2021-01-13 ENCOUNTER — TELEPHONE (OUTPATIENT)
Dept: FAMILY MEDICINE CLINIC | Age: 41
End: 2021-01-13

## 2021-01-13 DIAGNOSIS — Z86.39 HISTORY OF ELEVATED GLUCOSE: ICD-10-CM

## 2021-01-13 DIAGNOSIS — I10 ESSENTIAL HYPERTENSION: ICD-10-CM

## 2021-01-13 DIAGNOSIS — Z00.00 LABORATORY EXAMINATION ORDERED AS PART OF A ROUTINE GENERAL MEDICAL EXAMINATION: ICD-10-CM

## 2021-01-13 DIAGNOSIS — Z00.00 LABORATORY EXAMINATION ORDERED AS PART OF A ROUTINE GENERAL MEDICAL EXAMINATION: Primary | ICD-10-CM

## 2021-01-13 LAB
ALBUMIN SERPL-MCNC: 3 G/DL (ref 3.5–5.1)
ALP BLD-CCNC: 49 U/L (ref 38–126)
ALT SERPL-CCNC: 65 U/L (ref 11–66)
ANION GAP SERPL CALCULATED.3IONS-SCNC: 4 MEQ/L (ref 8–16)
AST SERPL-CCNC: 43 U/L (ref 5–40)
AVERAGE GLUCOSE: 147 MG/DL (ref 70–126)
BASOPHILS # BLD: 0.5 %
BASOPHILS ABSOLUTE: 0 THOU/MM3 (ref 0–0.1)
BILIRUB SERPL-MCNC: 0.3 MG/DL (ref 0.3–1.2)
BUN BLDV-MCNC: 20 MG/DL (ref 7–22)
CALCIUM SERPL-MCNC: 8.4 MG/DL (ref 8.5–10.5)
CHLORIDE BLD-SCNC: 104 MEQ/L (ref 98–111)
CHOLESTEROL, TOTAL: 129 MG/DL (ref 100–199)
CO2: 30 MEQ/L (ref 23–33)
CREAT SERPL-MCNC: 1.1 MG/DL (ref 0.4–1.2)
EOSINOPHIL # BLD: 2.8 %
EOSINOPHILS ABSOLUTE: 0.2 THOU/MM3 (ref 0–0.4)
ERYTHROCYTE [DISTWIDTH] IN BLOOD BY AUTOMATED COUNT: 12.6 % (ref 11.5–14.5)
ERYTHROCYTE [DISTWIDTH] IN BLOOD BY AUTOMATED COUNT: 41.2 FL (ref 35–45)
GFR SERPL CREATININE-BSD FRML MDRD: 73 ML/MIN/1.73M2
GLUCOSE BLD-MCNC: 119 MG/DL (ref 70–108)
HBA1C MFR BLD: 6.9 % (ref 4.4–6.4)
HCT VFR BLD CALC: 52 % (ref 42–52)
HDLC SERPL-MCNC: 34 MG/DL
HEMOGLOBIN: 17.7 GM/DL (ref 14–18)
IMMATURE GRANS (ABS): 0.03 THOU/MM3 (ref 0–0.07)
IMMATURE GRANULOCYTES: 0.5 %
LDL CHOLESTEROL CALCULATED: 86 MG/DL
LYMPHOCYTES # BLD: 11 %
LYMPHOCYTES ABSOLUTE: 0.6 THOU/MM3 (ref 1–4.8)
MCH RBC QN AUTO: 31 PG (ref 26–33)
MCHC RBC AUTO-ENTMCNC: 34 GM/DL (ref 32.2–35.5)
MCV RBC AUTO: 91.1 FL (ref 80–94)
MONOCYTES # BLD: 10.2 %
MONOCYTES ABSOLUTE: 0.6 THOU/MM3 (ref 0.4–1.3)
NUCLEATED RED BLOOD CELLS: 0 /100 WBC
PLATELET # BLD: 199 THOU/MM3 (ref 130–400)
PMV BLD AUTO: 10.7 FL (ref 9.4–12.4)
POTASSIUM SERPL-SCNC: 4.6 MEQ/L (ref 3.5–5.2)
RBC # BLD: 5.71 MILL/MM3 (ref 4.7–6.1)
SEG NEUTROPHILS: 75 %
SEGMENTED NEUTROPHILS ABSOLUTE COUNT: 4.3 THOU/MM3 (ref 1.8–7.7)
SODIUM BLD-SCNC: 138 MEQ/L (ref 135–145)
TOTAL PROTEIN: 5 G/DL (ref 6.1–8)
TRIGL SERPL-MCNC: 46 MG/DL (ref 0–199)
WBC # BLD: 5.7 THOU/MM3 (ref 4.8–10.8)

## 2021-01-13 NOTE — TELEPHONE ENCOUNTER
Pt at the lab and requesting new lab orders for his appt on 21. He states that his old orders have . He is fasting and would like to have these completed this morning. Okay for new orders?

## 2021-01-16 NOTE — PROGRESS NOTES
[facility]  [facility]  Dept: [phone]  Dept Fax: [phone]  JENNIFER Casiano is a 41 y.o.male    Pt presents for annual wellness physical exam.      Pt also presents for follow up of HTN, Lymphedema, GERD, DAYNA, and Hypoalbuminemia. Pt saw Maral Saavedra x 2 in past re hypoalbuminemia- no follow up needed as pt likely has normal variant Hypoalbuminemia. (updated 1/18/2021)    The home BP readings have been in the 110-120's / 70's range. Checking occasionally. Glucometer readings at home are not checked regularly. Pt feeling ok at this time- denies any new complaints. Hunting, hiking, and chopping wood on a regular basis     Wt Readings from Last 3 Encounters:   01/18/21 (!) 307 lb 12.8 oz (139.6 kg)   01/07/21 (!) 321 lb 9.6 oz (145.9 kg)   11/18/19 (!) 309 lb 6.4 oz (140.3 kg)   Weight increased 5# since last visit 16 months ago. Pt stable since last visit- no new problems for diagnoses listed below:  Patient Active Problem List   Diagnosis    HDL deficiency    Lymphedema of left leg- 1995- due to recurrent cellulitis    Essential hypertension    Gastroesophageal reflux disease without esophagitis    Allergic rhinitis due to pollen    Factor 5 Leiden mutation, heterozygous ([address]       Review of Systems   Constitutional: Negative for chills, diaphoresis, fatigue, fever and unexpected weight change. Eyes: Negative for visual disturbance. Respiratory: Negative for chest tightness and shortness of breath. Cardiovascular: Positive for leg swelling ( chronic and stable ). Negative for chest pain and palpitations. Gastrointestinal: Negative for abdominal pain, anal bleeding, blood in stool, constipation, diarrhea, nausea and vomiting. Genitourinary: Negative for dysuria and hematuria. Musculoskeletal: Negative for neck pain. Neurological: Negative for dizziness, light-headedness and headaches.      OBJECTIVE     /72 (Site: Right Upper Arm, Position: Sitting, Cuff Size: Large Adult)   Pulse 70   Temp 96.4 °F (35.8 °C) (Temporal)   Resp 16   Ht 6' 3\" (1.905 m)   Wt (!) 307 lb 12.8 oz (139.6 kg)   BMI 38.47 kg/m²      Wt Readings from Last 3 Encounters:   01/18/21 (!) 307 lb 12.8 oz (139.6 kg)   01/07/21 (!) 321 lb 9.6 oz (145.9 kg)   11/18/19 (!) 309 lb 6.4 oz (140.3 kg)     Physical Exam  Vitals signs and nursing note reviewed. Exam conducted with a chaperone present. Constitutional:       General: He is not in acute distress. Appearance: Normal appearance. He is not ill-appearing, toxic-appearing or diaphoretic. HENT:      Head: Normocephalic and atraumatic. Right Ear: External ear normal.      Left Ear: External ear normal.      Nose: Nose normal. No rhinorrhea. Mouth/Throat:      Mouth: Mucous membranes are moist.      Pharynx: Oropharynx is clear. Eyes:      General: No scleral icterus. Right eye: No discharge. Left eye: No discharge. Extraocular Movements: Extraocular movements intact. Conjunctiva/sclera: Conjunctivae normal.      Pupils: Pupils are equal, round, and reactive to light. Neck:      Musculoskeletal: Normal range of motion. Cardiovascular:      Rate and Rhythm: Normal rate and regular rhythm. Heart sounds: Normal heart sounds. No murmur. No friction rub. No gallop. Pulmonary:      Effort: Pulmonary effort is normal. No respiratory distress. Breath sounds: Normal breath sounds. No stridor. No wheezing, rhonchi or rales. Chest:      Chest wall: No tenderness. Abdominal:      General: Abdomen is flat. Bowel sounds are normal. There is no distension. Palpations: Abdomen is soft. There is no mass. Tenderness: There is no abdominal tenderness. There is no guarding or rebound. Hernia: No hernia is present. Musculoskeletal: Normal range of motion. General: No swelling, deformity or signs of injury.    Skin:     General: Skin is warm and dry.      Coloration: Skin is not jaundiced or pale. Findings: No bruising, erythema, lesion or rash. Neurological:      General: No focal deficit present. Mental Status: He is alert and oriented to person, place, and time. Mental status is at baseline. Motor: No weakness. Coordination: Coordination normal.      Gait: Gait normal.   Psychiatric:         Mood and Affect: Mood normal.         Behavior: Behavior normal.         Thought Content:  Thought content normal.         Judgment: Judgment normal.       Component      Latest Ref Rng & Units 1/13/2021   WBC      4.8 - 10.8 thou/mm3 5.7   RBC      4.70 - 6.10 mill/mm3 5.71   Hemoglobin Quant      14.0 - 18.0 gm/dl 17.7   Hematocrit      42.0 - 52.0 % 52.0   MCV      80.0 - 94.0 fL 91.1   MCH      26.0 - 33.0 pg 31.0   MCHC      32.2 - 35.5 gm/dl 34.0   RDW-CV      11.5 - 14.5 % 12.6   RDW-SD      35.0 - 45.0 fL 41.2   Platelet Count      043 - 400 thou/mm3 199   MPV      9.4 - 12.4 fL 10.7   Seg Neutrophils      % 75.0   Lymphocytes      % 11.0   Monocytes      % 10.2   Eosinophils      % 2.8   Basophils      % 0.5   Immature Granulocytes      % 0.5   Segs Absolute      1.8 - 7.7 thou/mm3 4.3   Lymphocytes Absolute      1.0 - 4.8 thou/mm3 0.6 (L)   Monocytes Absolute      0.4 - 1.3 thou/mm3 0.6   Eosinophils Absolute      0.0 - 0.4 thou/mm3 0.2   Basophils Absolute      0.0 - 0.1 thou/mm3 0.0   Immature Grans (Abs)      0.00 - 0.07 thou/mm3 0.03   Nucleated Red Blood Cells      /100 wbc 0   Glucose      70 - 108 mg/dL 119 (H)   Creatinine      0.4 - 1.2 mg/dL 1.1   BUN      7 - 22 mg/dL 20   Sodium      135 - 145 meq/L 138   Potassium      3.5 - 5.2 meq/L 4.6   Chloride      98 - 111 meq/L 104   CO2      23 - 33 meq/L 30   Calcium      8.5 - 10.5 mg/dL 8.4 (L)   AST      5 - 40 U/L 43 (H)   Alk Phos      38 - 126 U/L 49   Total Protein      6.1 - 8.0 g/dL 5.0 (L)   Albumin      3.5 - 5.1 g/dL 3.0 (L)   Bilirubin      0.3 - 1.2 mg/dL 0.3   ALT 11 - 66 U/L 65   Cholesterol, Total      100 - 199 mg/dL 129   Triglycerides      0 - 199 mg/dL 46   HDL Cholesterol      mg/dL 34   LDL Calculated      mg/dL 86   Hemoglobin A1C      4.4 - 6.4 % 6.9 (H)   AVERAGE GLUCOSE      70 - 126 mg/dL 147 (H)   Anion Gap      8.0 - 16.0 meq/L 4.0 (L)   Est, Glom Filt Rate      ml/min/1.73m2 73 (A)        Lab Results   Component Value Date    LABA1C 6.9 (H) 01/13/2021     No results found for: EAG    Lab Results   Component Value Date    CHOL 129 01/13/2021    TRIG 46 01/13/2021    HDL 34 01/13/2021    LDLCALC 86 01/13/2021       The ASCVD Risk score (Claven Venu., et al., 2013) failed to calculate for the following reasons:     The valid total cholesterol range is 130 to 320 mg/dL    Lab Results   Component Value Date     01/13/2021    K 4.6 01/13/2021     01/13/2021    CO2 30 01/13/2021    BUN 20 01/13/2021    CREATININE 1.1 01/13/2021    GLUCOSE 119 (H) 01/13/2021    CALCIUM 8.4 (L) 01/13/2021    PROT 5.0 (L) 01/13/2021    LABALBU 3.0 (L) 01/13/2021    BILITOT 0.3 01/13/2021    ALKPHOS 49 01/13/2021    AST 43 (H) 01/13/2021    ALT 65 01/13/2021    LABGLOM 73 (A) 01/13/2021       No results found for: Earl Lucero    Lab Results   Component Value Date    TSH 3.420 09/18/2019    T4FREE 1.24 09/18/2019     Lab Results   Component Value Date    WBC 5.7 01/13/2021    HGB 17.7 01/13/2021    HCT 52.0 01/13/2021    MCV 91.1 01/13/2021     01/13/2021       Immunization History   Administered Date(s) Administered    COVID-19, Moderna, 100mcg/0.5ml 12/23/2020    Influenza 11/01/2015    Influenza Vaccine, unspecified formulation 11/01/2016    Influenza Virus Vaccine 10/27/2017, 10/16/2018, 11/15/2020    Tdap (Boostrix, Adacel) 01/01/2010, 10/17/2019       Health Maintenance   Topic Date Due    Diabetic foot exam  03/28/1988    Diabetic retinal exam  03/28/1988    Diabetic microalbuminuria test  03/28/1996    COVID-19 Vaccine (2 of 2 - Moderna series) 01/20/2021    A1C test (Diabetic or Prediabetic)  01/13/2022    Lipid screen  01/13/2022    Potassium monitoring  01/13/2022    Creatinine monitoring  01/13/2022    DTaP/Tdap/Td vaccine (3 - Td) 10/17/2029    Flu vaccine  Completed    Hepatitis A vaccine  Aged Out    Hepatitis B vaccine  Aged Out    Hib vaccine  Aged Out    Meningococcal (ACWY) vaccine  Aged Out    Pneumococcal 0-64 years Vaccine  Aged Out    Hepatitis C screen  Discontinued    HIV screen  Discontinued       AAA ultrasound (Male, 65-75, smoked ever) indicated at this time? NO tobacco history  CT Lung Screen (55-80, 30 pk-yrs, smoking or quit <15 years) indicated at this time? NO tobacco history  Sleep Medicine referral indicated at this time (Obesity, Snoring, Daytime Somnolence, Apneic Episodes)? Known DAYNA on CPAP. Future Appointments   Date Time Provider Yanira Yost   1/6/2022  1:00 PM QIANA Ang 37     ASSESSMENT       Diagnosis Orders   1. Well adult exam     2. Diet-controlled diabetes mellitus (HCC)  Hemoglobin A1C    HDL Cholesterol    LDL Cholesterol, Direct    Comprehensive Metabolic Panel    Microalbumin / Creatinine Urine Ratio    T4, Free    TSH without Reflex    Hemoglobin A1C   3. Essential hypertension  HDL Cholesterol    LDL Cholesterol, Direct    Comprehensive Metabolic Panel    T4, Free    TSH without Reflex   4. Lymphedema of left leg- 1995- due to recurrent cellulitis  furosemide (LASIX) 40 MG tablet    potassium chloride (KLOR-CON M) 20 MEQ extended release tablet   5. Factor 5 Leiden mutation, heterozygous ([facility] [address]     6. Allergic rhinitis due to pollen, unspecified seasonality  fluticasone (FLONASE) 50 MCG/ACT nasal spray       PLAN      Continue to work diligently on diet, exercise, and weight loss  Check HGA1C, HDL, D-LDL, CMP, SPOT MA, and FREE T4/ TSH in 3 months  Check HGA1C in 6 months. Home BP's- call if > 140/90 on a regular basis.   Continue current meds Refills  Follow up in 6 months if 3 month labs ok          Preventive Health Topics:  Pt declines HEP C screening at this time due to lack of risk factors. (updated 1/18/2021)  Encouraged COVID VACCINE #2  when able  EYE EXAM due at this time per Dr. Gabriela Flores- please call for appt.                  Electronically signed Baylee Rand MD on 1/18/2021 at 4:48 PM

## 2021-01-18 ENCOUNTER — OFFICE VISIT (OUTPATIENT)
Dept: FAMILY MEDICINE CLINIC | Age: 41
End: 2021-01-18
Payer: COMMERCIAL

## 2021-01-18 VITALS
TEMPERATURE: 96.4 F | DIASTOLIC BLOOD PRESSURE: 72 MMHG | HEIGHT: 75 IN | SYSTOLIC BLOOD PRESSURE: 120 MMHG | WEIGHT: 307.8 LBS | HEART RATE: 70 BPM | BODY MASS INDEX: 38.27 KG/M2 | RESPIRATION RATE: 16 BRPM

## 2021-01-18 DIAGNOSIS — D68.51 FACTOR 5 LEIDEN MUTATION, HETEROZYGOUS (HCC): ICD-10-CM

## 2021-01-18 DIAGNOSIS — Z00.00 WELL ADULT EXAM: Primary | ICD-10-CM

## 2021-01-18 DIAGNOSIS — J30.1 ALLERGIC RHINITIS DUE TO POLLEN, UNSPECIFIED SEASONALITY: ICD-10-CM

## 2021-01-18 DIAGNOSIS — E11.9 DIET-CONTROLLED DIABETES MELLITUS (HCC): ICD-10-CM

## 2021-01-18 DIAGNOSIS — I89.0 LYMPHEDEMA OF LEFT LEG: ICD-10-CM

## 2021-01-18 DIAGNOSIS — I10 ESSENTIAL HYPERTENSION: ICD-10-CM

## 2021-01-18 PROCEDURE — 99396 PREV VISIT EST AGE 40-64: CPT | Performed by: FAMILY MEDICINE

## 2021-01-18 PROCEDURE — G8484 FLU IMMUNIZE NO ADMIN: HCPCS | Performed by: FAMILY MEDICINE

## 2021-01-18 RX ORDER — FUROSEMIDE 40 MG/1
40 TABLET ORAL DAILY PRN
Qty: 30 TABLET | Refills: 1 | Status: SHIPPED | OUTPATIENT
Start: 2021-01-18 | End: 2021-08-26 | Stop reason: SDUPTHER

## 2021-01-18 RX ORDER — POTASSIUM CHLORIDE 20 MEQ/1
20 TABLET, EXTENDED RELEASE ORAL DAILY PRN
Qty: 30 TABLET | Refills: 1 | Status: SHIPPED | OUTPATIENT
Start: 2021-01-18 | End: 2021-08-26 | Stop reason: SDUPTHER

## 2021-01-18 RX ORDER — FLUTICASONE PROPIONATE 50 MCG
SPRAY, SUSPENSION (ML) NASAL
Qty: 3 BOTTLE | Refills: 3 | Status: SHIPPED | OUTPATIENT
Start: 2021-01-18 | End: 2021-06-07

## 2021-01-18 ASSESSMENT — ENCOUNTER SYMPTOMS
CHEST TIGHTNESS: 0
DIARRHEA: 0
NAUSEA: 0
CONSTIPATION: 0
ABDOMINAL PAIN: 0
VOMITING: 0
SHORTNESS OF BREATH: 0
ANAL BLEEDING: 0
BLOOD IN STOOL: 0

## 2021-01-18 ASSESSMENT — PATIENT HEALTH QUESTIONNAIRE - PHQ9
SUM OF ALL RESPONSES TO PHQ QUESTIONS 1-9: 0
2. FEELING DOWN, DEPRESSED OR HOPELESS: 0

## 2021-01-18 NOTE — PATIENT INSTRUCTIONS
Continue to work diligently on diet, exercise, and weight loss  Check HGA1C, HDL, D-LDL, CMP, SPOT MA, and FREE T4/ TSH in 3 months  Check HGA1C in 6 months. Home BP's- call if > 140/90 on a regular basis. Continue current meds  Refills  Follow up in 6 months if 3 month labs ok           Preventive Health Topics:  Pt declines HEP C screening at this time due to lack of risk factors. (updated 1/18/2021)  Encouraged COVID VACCINE #2  when able  EYE EXAM due at this time per Dr. Cong Hutchins- please call for appt.

## 2021-03-18 ENCOUNTER — OFFICE VISIT (OUTPATIENT)
Dept: FAMILY MEDICINE CLINIC | Age: 41
End: 2021-03-18
Payer: COMMERCIAL

## 2021-03-18 VITALS
DIASTOLIC BLOOD PRESSURE: 78 MMHG | HEART RATE: 72 BPM | SYSTOLIC BLOOD PRESSURE: 122 MMHG | WEIGHT: 310 LBS | BODY MASS INDEX: 38.75 KG/M2 | RESPIRATION RATE: 16 BRPM | TEMPERATURE: 96.3 F

## 2021-03-18 DIAGNOSIS — B96.89 ACUTE BACTERIAL SINUSITIS: Primary | ICD-10-CM

## 2021-03-18 DIAGNOSIS — J01.90 ACUTE BACTERIAL SINUSITIS: Primary | ICD-10-CM

## 2021-03-18 PROCEDURE — G8484 FLU IMMUNIZE NO ADMIN: HCPCS | Performed by: NURSE PRACTITIONER

## 2021-03-18 PROCEDURE — 99213 OFFICE O/P EST LOW 20 MIN: CPT | Performed by: NURSE PRACTITIONER

## 2021-03-18 PROCEDURE — G8417 CALC BMI ABV UP PARAM F/U: HCPCS | Performed by: NURSE PRACTITIONER

## 2021-03-18 PROCEDURE — 1036F TOBACCO NON-USER: CPT | Performed by: NURSE PRACTITIONER

## 2021-03-18 PROCEDURE — G8427 DOCREV CUR MEDS BY ELIG CLIN: HCPCS | Performed by: NURSE PRACTITIONER

## 2021-03-18 RX ORDER — AMOXICILLIN AND CLAVULANATE POTASSIUM 875; 125 MG/1; MG/1
1 TABLET, FILM COATED ORAL 2 TIMES DAILY
Qty: 20 TABLET | Refills: 0 | Status: SHIPPED | OUTPATIENT
Start: 2021-03-18 | End: 2021-03-28

## 2021-03-18 ASSESSMENT — ENCOUNTER SYMPTOMS
EYES NEGATIVE: 1
COUGH: 1
CHEST TIGHTNESS: 1
SHORTNESS OF BREATH: 1
ABDOMINAL PAIN: 0
RHINORRHEA: 1
BLOOD IN STOOL: 0

## 2021-03-18 NOTE — PROGRESS NOTES
Chief Complaint   Patient presents with    Sinusitis     congestion x9 days, states his left side of chest feels pressure, no fever,          SUBJECTIVE     Enio IAN Cummings is a 41 y.o.male      Pt complains of sinus drainage and congestion for 9 days. Wife was ill prior to that. He does feel very fatigued and noticed that he has a cough with some chest tightness. He does feel like he has a rattle in his chest. MIld sob with exertion. He has been using nasal rinses and Flonase. He has also tried Motrin with minimal relief. He has been vaccinated for covid. Family members were covid positive several months ago and he thinks he may have had it back then. Review of Systems   Constitutional: Positive for fatigue. Negative for chills, fever and unexpected weight change. HENT: Positive for congestion, postnasal drip and rhinorrhea. Eyes: Negative. Respiratory: Positive for cough, chest tightness and shortness of breath (with exertion). Cardiovascular: Negative for chest pain, palpitations and leg swelling. Gastrointestinal: Negative for abdominal pain and blood in stool. Genitourinary: Negative for dysuria. Musculoskeletal: Negative for joint swelling. Skin: Negative for rash. Neurological: Negative for dizziness. Psychiatric/Behavioral: Negative. All other systems reviewed and are negative. OBJECTIVE     /78   Pulse 72   Temp 96.3 °F (35.7 °C) (Temporal)   Resp 16   Wt (!) 310 lb (140.6 kg)   BMI 38.75 kg/m²     Physical Exam  Vitals signs and nursing note reviewed. Constitutional:       Appearance: He is well-developed. He is not toxic-appearing. HENT:      Head: Normocephalic and atraumatic. Right Ear: External ear normal.      Left Ear: External ear normal.      Nose: Mucosal edema present. Eyes:      Conjunctiva/sclera: Conjunctivae normal.      Pupils: Pupils are equal, round, and reactive to light.    Neck:      Musculoskeletal: Normal range of motion and neck supple. Cardiovascular:      Rate and Rhythm: Normal rate and regular rhythm. Pulmonary:      Effort: Pulmonary effort is normal.      Breath sounds: Normal breath sounds. Abdominal:      General: Bowel sounds are normal.      Palpations: Abdomen is soft. Musculoskeletal: Normal range of motion. Skin:     General: Skin is warm and dry. Neurological:      Mental Status: He is alert and oriented to person, place, and time. Deep Tendon Reflexes: Reflexes are normal and symmetric. Psychiatric:         Behavior: Behavior normal.         Thought Content: Thought content normal.         Judgment: Judgment normal.           No results found for this visit on 03/18/21. ASSESSMENT       Diagnosis Orders   1.  Acute bacterial sinusitis         PLAN     Requested Prescriptions     Signed Prescriptions Disp Refills    amoxicillin-clavulanate (AUGMENTIN) 875-125 MG per tablet 20 tablet 0     Sig: Take 1 tablet by mouth 2 times daily for 10 days       Augmentin sent to pharmacy  Pt declines cough suppressant at this time  Increase fluids and rest  RTO if symptoms worsen or stay the same            Electronically signed by QIANA Buchanan CNP on 3/18/2021 at 12:27 PM

## 2021-03-18 NOTE — PATIENT INSTRUCTIONS
Patient Education        Sinusitis: Care Instructions  Your Care Instructions     Sinusitis is an infection of the lining of the sinus cavities in your head. Sinusitis often follows a cold. It causes pain and pressure in your head and face. In most cases, sinusitis gets better on its own in 1 to 2 weeks. But some mild symptoms may last for several weeks. Sometimes antibiotics are needed. Follow-up care is a key part of your treatment and safety. Be sure to make and go to all appointments, and call your doctor if you are having problems. It's also a good idea to know your test results and keep a list of the medicines you take. How can you care for yourself at home? · Take an over-the-counter pain medicine, such as acetaminophen (Tylenol), ibuprofen (Advil, Motrin), or naproxen (Aleve). Read and follow all instructions on the label. · If the doctor prescribed antibiotics, take them as directed. Do not stop taking them just because you feel better. You need to take the full course of antibiotics. · Be careful when taking over-the-counter cold or flu medicines and Tylenol at the same time. Many of these medicines have acetaminophen, which is Tylenol. Read the labels to make sure that you are not taking more than the recommended dose. Too much acetaminophen (Tylenol) can be harmful. · Breathe warm, moist air from a steamy shower, a hot bath, or a sink filled with hot water. Avoid cold, dry air. Using a humidifier in your home may help. Follow the directions for cleaning the machine. · Use saline (saltwater) nasal washes. This can help keep your nasal passages open and wash out mucus and bacteria. You can buy saline nose drops at a grocery store or drugstore. Or you can make your own at home by adding 1 teaspoon of salt and 1 teaspoon of baking soda to 2 cups of distilled water. If you make your own, fill a bulb syringe with the solution, insert the tip into your nostril, and squeeze gently. [place] your nose.   · Put a hot, wet towel or a warm gel pack on your face 3 or 4 times a day for 5 to 10 minutes each time. · Try a decongestant nasal spray like oxymetazoline (Afrin). Do not use it for more than 3 days in a row. Using it for more than 3 days can make your congestion worse. When should you call for help? Call your doctor now or seek immediate medical care if:    · You have new or worse swelling or redness in your face or around your eyes.     · You have a new or higher fever. Watch closely for changes in your health, and be sure to contact your doctor if:    · You have new or worse facial pain.     · The mucus from your nose becomes thicker (like pus) or has new blood in it.     · You are not getting better as expected. Where can you learn more? Go to https://[employer]. org and sign in to your [employer] account. Enter A226 in the [employer] box to learn more about \"Sinusitis: Care Instructions. \"     If you do not have an account, please click on the \"Sign Up Now\" link. Current as of: December 2, 2020               Content Version: 12.8  © 7188-6781 Healthwise, Incorporated. Care instructions adapted under license by [facility] ([facility]). If you have questions about a medical condition or this instruction, always ask your healthcare professional. [address]  any warranty or liability for your use of this information. Health Care Proxy (HCP)

## 2021-06-07 DIAGNOSIS — J30.1 ALLERGIC RHINITIS DUE TO POLLEN, UNSPECIFIED SEASONALITY: ICD-10-CM

## 2021-06-07 RX ORDER — FLUTICASONE PROPIONATE 50 MCG
SPRAY, SUSPENSION (ML) NASAL
Qty: 48 G | Refills: 3 | Status: SHIPPED | OUTPATIENT
Start: 2021-06-07 | End: 2022-03-21 | Stop reason: SDUPTHER

## 2021-07-30 ENCOUNTER — OFFICE VISIT (OUTPATIENT)
Dept: FAMILY MEDICINE CLINIC | Age: 41
End: 2021-07-30
Payer: COMMERCIAL

## 2021-07-30 VITALS
DIASTOLIC BLOOD PRESSURE: 76 MMHG | WEIGHT: 307.8 LBS | RESPIRATION RATE: 16 BRPM | HEART RATE: 84 BPM | SYSTOLIC BLOOD PRESSURE: 120 MMHG | BODY MASS INDEX: 38.47 KG/M2

## 2021-07-30 DIAGNOSIS — L02.415 ABSCESS OF RIGHT THIGH: ICD-10-CM

## 2021-07-30 DIAGNOSIS — L03.115 CELLULITIS OF RIGHT LOWER EXTREMITY: Primary | ICD-10-CM

## 2021-07-30 PROCEDURE — 99213 OFFICE O/P EST LOW 20 MIN: CPT | Performed by: NURSE PRACTITIONER

## 2021-07-30 PROCEDURE — G8417 CALC BMI ABV UP PARAM F/U: HCPCS | Performed by: NURSE PRACTITIONER

## 2021-07-30 PROCEDURE — G8427 DOCREV CUR MEDS BY ELIG CLIN: HCPCS | Performed by: NURSE PRACTITIONER

## 2021-07-30 PROCEDURE — 1036F TOBACCO NON-USER: CPT | Performed by: NURSE PRACTITIONER

## 2021-07-30 RX ORDER — DOXYCYCLINE HYCLATE 100 MG
100 TABLET ORAL 2 TIMES DAILY
Qty: 20 TABLET | Refills: 0 | Status: SHIPPED | OUTPATIENT
Start: 2021-07-30 | End: 2021-08-09

## 2021-07-30 SDOH — ECONOMIC STABILITY: FOOD INSECURITY: WITHIN THE PAST 12 MONTHS, THE FOOD YOU BOUGHT JUST DIDN'T LAST AND YOU DIDN'T HAVE MONEY TO GET MORE.: NEVER TRUE

## 2021-07-30 SDOH — ECONOMIC STABILITY: FOOD INSECURITY: WITHIN THE PAST 12 MONTHS, YOU WORRIED THAT YOUR FOOD WOULD RUN OUT BEFORE YOU GOT MONEY TO BUY MORE.: NEVER TRUE

## 2021-07-30 ASSESSMENT — ENCOUNTER SYMPTOMS
ABDOMINAL PAIN: 0
SHORTNESS OF BREATH: 0
EYES NEGATIVE: 1
CHEST TIGHTNESS: 0
BLOOD IN STOOL: 0

## 2021-07-30 ASSESSMENT — SOCIAL DETERMINANTS OF HEALTH (SDOH): HOW HARD IS IT FOR YOU TO PAY FOR THE VERY BASICS LIKE FOOD, HOUSING, MEDICAL CARE, AND HEATING?: NOT HARD AT ALL

## 2021-07-30 NOTE — PROGRESS NOTES
Chief Complaint   Patient presents with    Leg Pain     C/O right shin pain, swelling x 2 days, no known injury, does have a blister on shin, also has a bug bite on right hip area x 4-5 days         SUBJECTIVE     Sangeetha Mota is a 35 y.o.male      Pt patient is here today for evaluation of blister on right shin with tenderness and edema for the last 2 days. No known injury. Patient requested evaluation due to history of rapidly developing cellulitis requiring hospitalization on 3 different occasions when he was in his late teens and early 19's. Patient does have chronic lymphedema of left leg due to this. Patient also notes possible bug bite or abscess to right upper thigh/hip area for the last 4 to 5 days. He has tried to squeeze the area but he was unable to express anything. The area is slightly tender to the touch. Review of Systems   Constitutional: Negative for chills, fatigue, fever and unexpected weight change. HENT: Negative. Eyes: Negative. Respiratory: Negative for chest tightness and shortness of breath. Cardiovascular: Positive for leg swelling. Negative for chest pain and palpitations. Gastrointestinal: Negative for abdominal pain and blood in stool. Genitourinary: Negative for dysuria. Musculoskeletal: Negative for joint swelling. Skin: Positive for wound (right shin). Negative for rash. Bug bite to right thigh   Neurological: Negative for dizziness. Psychiatric/Behavioral: Negative. All other systems reviewed and are negative. OBJECTIVE     /76   Pulse 84   Resp 16   Wt (!) 307 lb 12.8 oz (139.6 kg)   BMI 38.47 kg/m²     Physical Exam  Vitals and nursing note reviewed. Constitutional:       Appearance: He is well-developed. HENT:      Head: Normocephalic and atraumatic.       Right Ear: External ear normal.      Left Ear: External ear normal.      Nose: Nose normal.   Eyes:      Conjunctiva/sclera: Conjunctivae normal.      Pupils: Pupils are equal, round, and reactive to light. Cardiovascular:      Rate and Rhythm: Normal rate and regular rhythm. Pulmonary:      Effort: Pulmonary effort is normal.      Breath sounds: Normal breath sounds. Abdominal:      General: Bowel sounds are normal.      Palpations: Abdomen is soft. Musculoskeletal:         General: Normal range of motion. Cervical back: Normal range of motion and neck supple. Skin:     General: Skin is warm and dry. Findings: Abscess (Approximately 1 inch induration) and wound (To right shin. Mild warmth and edema. See picture below.) present. Neurological:      Mental Status: He is alert and oriented to person, place, and time. Deep Tendon Reflexes: Reflexes are normal and symmetric. Psychiatric:         Behavior: Behavior normal.         Thought Content: Thought content normal.         Judgment: Judgment normal.       Media Information     Document Information    Wound   Right shin   07/30/2021 14:25   Attached To: Office Visit on 7/30/21 with QIANA Cabello CNP   Source Information    QIANA Cabello CNP  [facility]         No results found for this visit on 07/30/21. ASSESSMENT       Diagnosis Orders   1. Cellulitis of right lower extremity  doxycycline hyclate (VIBRA-TABS) 100 MG tablet   2.  Abscess of right thigh  doxycycline hyclate (VIBRA-TABS) 100 MG tablet       PLAN     Requested Prescriptions     Signed Prescriptions Disp Refills    doxycycline hyclate (VIBRA-TABS) 100 MG tablet 20 tablet 0     Sig: Take 1 tablet by mouth 2 times daily for 10 days     After discussion with patient will send in doxycycline given appearance of current skin issues and history of rapidly developing cellulitis requiring hospitalization  Patient keep area clean and dry  Elevate leg  when sitting or laying  Office if symptoms worsen or fail to improve          Electronically signed by QIANA Montero CNP on 7/30/2021 at 2:37 PM

## 2021-07-30 NOTE — PATIENT INSTRUCTIONS
Patient Education        doxycycline (oral/injection)  Pronunciation:  DOX kalyn kay  Brand:  Acticlate, Adoxa, Alodox, Avidoxy, Doryx, Mondoxyne NL, Monodox, Morgidox, Okebo, Oracea, Oraxyl, Targadox, Vibramycin  What is the most important information I should know about doxycycline? You should not take this medicine if you are allergic to any tetracycline antibiotic. Children younger than 6years old should use doxycycline only in cases of severe or life-threatening conditions. This medicine can cause permanent yellowing or graying of the teeth in children  Using doxycycline during pregnancy could harm the unborn baby or cause permanent tooth discoloration later in the baby's life. What is doxycycline? Doxycycline is a tetracycline antibiotic that  Doxycycline is used to treat many different bacterial infections, such as acne, urinary tract infections, intestinal infections, eye infections, gonorrhea, chlamydia, periodontitis (gum disease), and others. Doxycycline is also used to treat blemishes, bumps, and acne-like lesions caused by rosacea. Doxycycline will not treat facial redness caused by rosacea. Some forms of doxycycline are used to prevent malaria, to treat anthrax, or to treat infections caused by mites, ticks, or lice. Doxycycline may also be used for purposes not listed in this medication guide. What should I discuss with my healthcare provider before taking doxycycline? You should not take this medicine if you are allergic to doxycycline or other tetracycline antibiotics such as demeclocycline, minocycline, tetracycline, or tigecycline. Tell your doctor if you have ever had:  · liver disease;  · kidney disease;  · asthma or sulfite allergy;  · increased pressure inside your skull; or  · if you also take isotretinoin, seizure medicine, or a blood thinner such as warfarin (Coumadin).   If you are using doxycycline to treat gonorrhea, your doctor may test you to make sure you do not also have syphilis, another sexually transmitted disease. Taking this medicine during pregnancy may affect tooth and bone development in the unborn baby. Taking doxycycline during the last half of pregnancy can cause permanent tooth discoloration later in the baby's life. Tell your doctor if you are pregnant or if you become pregnant. Doxycycline can make birth control pills less effective. Ask your doctor about using a non-hormonal birth control (condom, diaphragm with spermicide) to prevent pregnancy. Doxycycline can pass into breast milk and may affect bone and tooth development in a nursing infant. Do not breastfeed while you are taking doxycycline. Doxycycline can cause permanent yellowing or graying of the teeth in children younger than 6years old. Children should use doxycycline only in cases of severe or life-threatening conditions such as anthrax or [facility] spotted fever. The benefit of treating a serious condition may outweigh any risks to the child's tooth development. How should I take doxycycline? Follow all directions on your prescription label and read all medication guides or instruction sheets. Use the medicine exactly as directed. Take doxycycline with a full glass of water. Drink plenty of liquids while you are taking doxycycline. Read and carefully follow any Instructions for Use provided with your medicine. Ask your doctor or pharmacist if you do not understand these instructions. Most brands of doxycyline may be taken with food or milk if the medicine upsets your stomach. Different brands of doxycycline may have different instructions about taking them with or without food. Take Oracea on an empty stomach, at least 1 hour before or 2 hours after a meal.  You may need to split a doxycycline tablet to get the correct dose. Follow your doctor's instructions. Swallow a delayed-release capsule or tablet whole. Do not crush, chew, break, or open it.   Measure liquid medicine  with the dosing syringe provided, or with a special dose-measuring spoon or medicine cup. If you do not have a dose-measuring device, ask your pharmacist for one. If you take doxycycline to prevent malaria: Start taking the medicine 1 or 2 days before entering an area where malaria is common. Continue taking the medicine every day during your stay and for at least 4 weeks after you leave the area. Doxycycline is usually given by injection only if you are unable to take the medicine by mouth. A healthcare provider will give you this injection as an infusion into a vein. Use this medicine for the full prescribed length of time, even if your symptoms quickly improve. Skipping doses can increase your risk of infection that is resistant to medication. Doxycycline will not treat a viral infection such as the flu or a common cold. Store at room temperature away from moisture, heat, and light. Throw away any unused medicine after the expiration date on the label has passed. Using  doxycycline can cause damage to your kidneys. What happens if I miss a dose? Take the medicine as soon as you can, but skip the missed dose if it is almost time for your next dose. Do not take two doses at one time. What happens if I overdose? Seek emergency medical attention or call the Poison Help line at 1-602.756.7809. What should I avoid while taking doxycycline? Do not take iron supplements, multivitamins, calcium supplements, antacids, or laxatives within 2 hours before or after taking doxycycline. Avoid taking any other antibiotics with doxycycline unless your doctor has told you to. Doxycycline could make you sunburn more easily. Avoid sunlight or tanning beds. Wear protective clothing and use sunscreen (SPF 30 or higher) when you are outdoors. Antibiotic medicines can cause diarrhea, which may be a sign of a new infection. If you have diarrhea that is watery or bloody, call your doctor.  Do not use anti-diarrhea medicine unless your doctor tells you to. What are the possible side effects of doxycycline? Get emergency medical help if you have signs of an allergic reaction (hives, difficult breathing, swelling in your face or throat) or a severe skin reaction (fever, sore throat, burning in your eyes, skin pain, red or purple skin rash that spreads and causes blistering and peeling). Seek medical treatment if you have a serious drug reaction that can affect many parts of your body. Symptoms may include: skin rash, fever, swollen glands, flu-like symptoms, muscle aches, severe weakness, unusual bruising, or yellowing of your skin or eyes. This reaction may occur several weeks after you began using doxycycline. Call your doctor at once if you have:  · severe stomach pain, diarrhea that is watery or bloody;  · throat irritation, trouble swallowing;  · chest pain, irregular heart rhythm, feeling short of breath;  · little or no urination;  · low white blood cell counts --fever, chills, swollen glands, body aches, weakness, pale skin, easy bruising or bleeding;  · increased pressure inside the skull --severe headaches, ringing in your ears, dizziness, nausea, vision problems, pain behind your eyes; or  · signs of liver or pancreas problems --loss of appetite, upper stomach pain (that may spread to your back), tiredness, nausea or vomiting, fast heart rate, dark urine, jaundice (yellowing of the skin or eyes). Common side effects may include:  · nausea, vomiting, upset stomach, loss of appetite;  · mild diarrhea;  · skin rash or itching;  · darkened skin color; or  · vaginal itching or discharge. This is not a complete list of side effects and others may occur. Call your doctor for medical advice about side effects. You may report side effects to FDA at 2-874-FDA-6878. What other drugs will affect doxycycline? Sometimes it is not safe to use certain medications at the same time.  Some drugs can affect your blood levels of other drugs you take, which may increase side effects or make the medications less effective. Other drugs may affect doxycycline, including prescription and over-the-counter medicines, vitamins, and herbal products. Tell your doctor about all your current medicines and any medicine you start or stop using. Where can I get more information? Your pharmacist can provide more information about doxycycline. Remember, keep this and all other medicines out of the reach of children, never share your medicines with others, and use this medication only for the indication prescribed. Every effort has been made to ensure that the information provided by [address] is accurate, up-to-date, and complete, but no guarantee is made to that effect. Drug information contained herein may be time sensitive. [facility] information has been compiled for use by healthcare practitioners and consumers in the [place] and therefore [facility] does not warrant that uses outside of the [place] are appropriate, unless specifically indicated otherwise. [facility]'s drug information does not endorse drugs, diagnose patients or recommend therapy. [facility] drug information is an informational resource designed to assist licensed healthcare practitioners in caring for their patients and/or to serve consumers viewing this service as a supplement to, and not a substitute for, the expertise, skill, knowledge and judgment of healthcare practitioners. The absence of a warning for a given drug or drug combination in no way should be construed to indicate that the drug or drug combination is safe, effective or appropriate for any given patient. [facility] does not assume any responsibility for any aspect of healthcare administered with the aid of information [facility] provides. The information contained herein is not intended to cover all possible uses, directions, precautions, warnings, drug interactions, allergic reactions, or adverse effects.  If you have questions about the drugs you are taking, check with your doctor, nurse or pharmacist.  Copyright 2098-9851 [address]: 21.04. Revision date: 11/4/2020. Care instructions adapted under license by [facility] ([facility]). If you have questions about a medical condition or this instruction, always ask your healthcare professional. [place] [address] any warranty or liability for your use of this information.

## 2021-08-20 ENCOUNTER — NURSE ONLY (OUTPATIENT)
Dept: LAB | Age: 41
End: 2021-08-20

## 2021-08-20 DIAGNOSIS — I10 ESSENTIAL HYPERTENSION: ICD-10-CM

## 2021-08-20 DIAGNOSIS — E11.9 DIET-CONTROLLED DIABETES MELLITUS (HCC): ICD-10-CM

## 2021-08-20 LAB
ALBUMIN SERPL-MCNC: 3.5 G/DL (ref 3.5–5.1)
ALP BLD-CCNC: 53 U/L (ref 38–126)
ALT SERPL-CCNC: 57 U/L (ref 11–66)
ANION GAP SERPL CALCULATED.3IONS-SCNC: 5 MEQ/L (ref 8–16)
AST SERPL-CCNC: 33 U/L (ref 5–40)
AVERAGE GLUCOSE: 120 MG/DL (ref 70–126)
BILIRUB SERPL-MCNC: 0.4 MG/DL (ref 0.3–1.2)
BUN BLDV-MCNC: 17 MG/DL (ref 7–22)
CALCIUM SERPL-MCNC: 8.2 MG/DL (ref 8.5–10.5)
CHLORIDE BLD-SCNC: 104 MEQ/L (ref 98–111)
CO2: 30 MEQ/L (ref 23–33)
CREAT SERPL-MCNC: 1.1 MG/DL (ref 0.4–1.2)
CREATININE, URINE: 202.5 MG/DL
GFR SERPL CREATININE-BSD FRML MDRD: 73 ML/MIN/1.73M2
GLUCOSE BLD-MCNC: 118 MG/DL (ref 70–108)
HBA1C MFR BLD: 6 % (ref 4.4–6.4)
HDLC SERPL-MCNC: 36 MG/DL
LDL CHOLESTEROL DIRECT: 80.33 MG/DL
MICROALBUMIN UR-MCNC: < 1.2 MG/DL
MICROALBUMIN/CREAT UR-RTO: 6 MG/G (ref 0–30)
POTASSIUM SERPL-SCNC: 4.2 MEQ/L (ref 3.5–5.2)
SODIUM BLD-SCNC: 139 MEQ/L (ref 135–145)
T4 FREE: 1.02 NG/DL (ref 0.93–1.76)
TOTAL PROTEIN: 5.2 G/DL (ref 6.1–8)
TSH SERPL DL<=0.05 MIU/L-ACNC: 2.72 UIU/ML (ref 0.4–4.2)

## 2021-08-22 NOTE — PROGRESS NOTES
[facility]  [facility]  Dept: [phone]  Dept Fax: [phone]  SUBJECTIVE     Arsen Garcia is a 35 y.o.male     Patient presents for follow-up of HTN, Lymphedema, GERD, DAYNA, and Hypoalbuminemia. Pt saw Penny Leaks x 2 in past re hypoalbuminemia- no follow up needed as pt likely has normal variant Hypoalbuminemia.  (updated 8/26/2021)    Pt recently started left-over Prednisone due to poison ivy/ Rhus dermatitis-  Pt to plan taper at this time. I spoke with pt last week re pain in throat/ throat fullness and ear fullness for past 3 week, associated with laryngitis- started Zyrtec, Flonase on a regular basis. Wt Readings from Last 3 Encounters:   08/26/21 (!) 311 lb (141.1 kg)   07/30/21 (!) 307 lb 12.8 oz (139.6 kg)   03/18/21 (!) 310 lb (140.6 kg)   Weight increased 4# since last visit 6-7 months ago. Pt stable since last visit- no new problems for diagnoses listed below:  Patient Active Problem List   Diagnosis    HDL deficiency    Lymphedema of left leg- 1995- due to recurrent cellulitis    Essential hypertension    Gastroesophageal reflux disease without esophagitis    Allergic rhinitis due to pollen    [facility] Le[facility]     Review of Systems   Constitutional: Negative for chills, diaphoresis, fatigue, fever and unexpected weight change. Eyes: Negative for visual disturbance. Respiratory: Negative for chest tightness and shortness of breath. Cardiovascular: Positive for leg swelling (Lymphedema- worse in heat). Negative for chest pain and palpitations. Gastrointestinal: Negative for abdominal pain, anal bleeding, blood in stool, constipation, diarrhea, nausea and vomiting. Genitourinary: Negative for dysuria and hematuria. Musculoskeletal: Negative for neck pain. Neurological: Negative for dizziness, light-headedness and headaches.      OBJECTIVE     /72   Pulse 72   Resp 16   Wt (!) 311 lb (141.1 kg) BMI 38.87 kg/m²     Wt Readings from Last 3 Encounters:   08/26/21 (!) 311 lb (141.1 kg)   07/30/21 (!) 307 lb 12.8 oz (139.6 kg)   03/18/21 (!) 310 lb (140.6 kg)     Physical Exam  Vitals and nursing note reviewed. Constitutional:       Appearance: He is well-developed. HENT:      Head: Normocephalic and atraumatic. Right Ear: External ear normal.      Left Ear: External ear normal.      Nose: Nose normal.   Eyes:      Conjunctiva/sclera: Conjunctivae normal.      Pupils: Pupils are equal, round, and reactive to light. Cardiovascular:      Rate and Rhythm: Normal rate and regular rhythm. Pulmonary:      Effort: Pulmonary effort is normal.      Breath sounds: Normal breath sounds. Abdominal:      General: Bowel sounds are normal.      Palpations: Abdomen is soft. Genitourinary:     Comments: MASSIEL deferred today as pt currently asymptomatic. Pt denies dysuria, hematuria, frequency, urgency, difficulty starting/ stopping stream, frequent nocturia    Will reconsider annually. ES    Musculoskeletal:         General: Normal range of motion. Cervical back: Normal range of motion and neck supple. Skin:     General: Skin is warm and dry. Neurological:      Mental Status: He is alert and oriented to person, place, and time. Deep Tendon Reflexes: Reflexes are normal and symmetric. Psychiatric:         Behavior: Behavior normal.         Thought Content:  Thought content normal.         Judgment: Judgment normal.         Component      Latest Ref Rng & Units 8/20/2021   Glucose      70 - 108 mg/dL 118 (H)   Creatinine      0.4 - 1.2 mg/dL 1.1   BUN      7 - 22 mg/dL 17   Sodium      135 - 145 meq/L 139   Potassium      3.5 - 5.2 meq/L 4.2   Chloride      98 - 111 meq/L 104   CO2      23 - 33 meq/L 30   Calcium      8.5 - 10.5 mg/dL+ 8.2 (L)   AST      5 - 40 U/L 33   Alk Phos      38 - 126 U/L 53   Total Protein      6.1 - 8.0 g/dL 5.2 (L)   Albumin      3.5 - 5.1 g/dL 3.5   Bilirubin      0.3 - 1.2 mg/dL 0.4   ALT      11 - 66 U/L 57   Microalbumin, Random Urine      mg/dL < 1.20   Creatinine, Urine      mg/dL 202.5   Microalb/Creat Ratio      0 - 30 mg/g 6   Hemoglobin A1C      4.4 - 6.4 % 6.0   AVERAGE GLUCOSE      70 - 126 mg/dL 120   HDL Cholesterol      mg/dL 36   LDL Direct      mg/dl 80.33   TSH      0.400 - 4.200 uIU/mL 2.720   T4 Free      0.93 - 1.76 ng/dL 1.02   Anion Gap      8.0 - 16.0 meq/L 5.0 (L)   Est, Glom Filt Rate      ml/min/1.73m2 73 (A)         Lab Results   Component Value Date    LABA1C 6.0 08/20/2021     No results found for: EAG    Lab Results   Component Value Date    CHOL 129 01/13/2021    TRIG 46 01/13/2021    HDL 36 08/20/2021    LDLCALC 86 01/13/2021    LDLDIRECT 80.33 08/20/2021       The ASCVD Risk score (Shira Mobley, et al., 2013) failed to calculate for the following reasons:     The valid total cholesterol range is 130 to 320 mg/dL    Lab Results   Component Value Date     08/20/2021    K 4.2 08/20/2021     08/20/2021    CO2 30 08/20/2021    BUN 17 08/20/2021    CREATININE 1.1 08/20/2021    GLUCOSE 118 (H) 08/20/2021    CALCIUM 8.2 (L) 08/20/2021    PROT 5.2 (L) 08/20/2021    LABALBU 3.5 08/20/2021    BILITOT 0.4 08/20/2021    ALKPHOS 53 08/20/2021    AST 33 08/20/2021    ALT 57 08/20/2021    LABGLOM 73 (A) 08/20/2021       Lab Results   Component Value Date    LABMICR < 1.20 08/20/2021       Lab Results   Component Value Date    TSH 2.720 08/20/2021    T4FREE 1.02 08/20/2021       Lab Results   Component Value Date    WBC 5.7 01/13/2021    HGB 17.7 01/13/2021    HCT 52.0 01/13/2021    MCV 91.1 01/13/2021     01/13/2021       Immunization History   Administered Date(s) Administered    COVID-19, Moderna, PF, 100mcg/0.5mL 12/22/2020, 12/23/2020, 01/20/2021, 01/20/2021    Influenza 11/01/2015    Influenza Vaccine, unspecified formulation 11/01/2016    Influenza Virus Vaccine 10/27/2017, 10/16/2018, 11/15/2020    Tdap (Boostrix, Adacel) 01/01/2010, 10/17/2019       Health Maintenance   Topic Date Due    Pneumococcal 0-64 years Vaccine (1 of 2 - PPSV23) Never done    Diabetic foot exam  Never done    Diabetic retinal exam  Never done    Hepatitis B vaccine (1 of 3 - Risk 3-dose series) Never done    Flu vaccine (1) 09/01/2021    A1C test (Diabetic or Prediabetic)  08/20/2022    Diabetic microalbuminuria test  08/20/2022    Lipid screen  08/20/2022    Potassium monitoring  08/20/2022    Creatinine monitoring  08/20/2022    DTaP/Tdap/Td vaccine (3 - Td or Tdap) 10/17/2029    COVID-19 Vaccine  Completed    Hepatitis A vaccine  Aged Out    Hib vaccine  Aged Out    Meningococcal (ACWY) vaccine  Aged Out    Hepatitis C screen  Discontinued    HIV screen  Discontinued     AAA ultrasound (Male, 65-75, smoked ever) indicated at this time? No tobacco history  CT Lung Screen (50-80, 20 pk-yrs, smoking or quit <15 years) indicated at this time? No tobacco history  Sleep Medicine referral indicated at this time (Obesity, Snoring, Daytime Somnolence, Apneic Episodes)? Known DAYNA on CPAP    Future Appointments   Date Time Provider Yanira Yost   1/6/2022  1:00 PM [facility] [place]   4/14/2022  9:45 AM Laura Zurita MD [facility]     ASSESSMENT       Diagnosis Orders   1. Diet-controlled diabetes mellitus ([facility]  Hemoglobin A1C    Lipid Panel    Comprehensive Metabolic Panel    CBC Auto Differential   2. Essential hypertension  Lipid Panel    Comprehensive Metabolic Panel    CBC Auto Differential   3. Allergic rhinitis due to pollen, unspecified seasonality  CBC Auto Differential   4. Lymphedema of left leg- 1995- due to recurrent cellulitis  CBC Auto Differential    furosemide (LASIX) 40 MG tablet    potassium chloride (KLOR-CON M) 20 MEQ extended release tablet   5. Factor 5 Leiden mutation, heterozygous ([address]  CBC Auto Differential   6.  Gastroesophageal reflux disease without esophagitis  CBC Auto Differential   7. Neck fullness  US HEAD NECK SOFT TISSUE THYROID       PLAN      Ok to continue Prednisone taper 20 mg- 1.5 pills daily for 3 days, then 1 pill daily for 3 days, then 1/2 pill daily for 3 days   After discussion with pt, will check Ultrasound of soft tissues of neck/ thyroid at this time. Consider ENT referral if symptoms persist, despite oral steroid taper, Flonase, and Zyrtec. Continue to work diligently on diet, exercise, and weight loss  Home BP's- call if > 140/90 on a regular basis. Check HG A1c, FLP, CMP, and CBC in 6-8 months  Continue current meds  Refills  Follow up in 68 months                Preventive Health Topics:  Encouraged COVID VACCINE  booster when able  Encouraged annual FLU VACCINE after October 1st.    Encouraged PNEUMOVAX 23 at this time- check with insurance re coverage and location of administration. ([facility]ctors office vs local pharmacy)  EYE EXAM due at this time per Dr. Hammad Case- please call for appt.  (updated 8/26/2021)       Electronically signed Arcadio Benavides MD on 8/26/2021 at 11:19 AM

## 2021-08-26 ENCOUNTER — OFFICE VISIT (OUTPATIENT)
Dept: FAMILY MEDICINE CLINIC | Age: 41
End: 2021-08-26
Payer: COMMERCIAL

## 2021-08-26 VITALS
BODY MASS INDEX: 38.87 KG/M2 | SYSTOLIC BLOOD PRESSURE: 122 MMHG | DIASTOLIC BLOOD PRESSURE: 72 MMHG | HEART RATE: 72 BPM | RESPIRATION RATE: 16 BRPM | WEIGHT: 311 LBS

## 2021-08-26 DIAGNOSIS — R22.1 NECK FULLNESS: ICD-10-CM

## 2021-08-26 DIAGNOSIS — I10 ESSENTIAL HYPERTENSION: ICD-10-CM

## 2021-08-26 DIAGNOSIS — J30.1 ALLERGIC RHINITIS DUE TO POLLEN, UNSPECIFIED SEASONALITY: ICD-10-CM

## 2021-08-26 DIAGNOSIS — D68.51 FACTOR 5 LEIDEN MUTATION, HETEROZYGOUS (HCC): ICD-10-CM

## 2021-08-26 DIAGNOSIS — K21.9 GASTROESOPHAGEAL REFLUX DISEASE WITHOUT ESOPHAGITIS: ICD-10-CM

## 2021-08-26 DIAGNOSIS — E11.9 DIET-CONTROLLED DIABETES MELLITUS (HCC): Primary | ICD-10-CM

## 2021-08-26 DIAGNOSIS — I89.0 LYMPHEDEMA OF LEFT LEG: ICD-10-CM

## 2021-08-26 PROCEDURE — G8427 DOCREV CUR MEDS BY ELIG CLIN: HCPCS | Performed by: FAMILY MEDICINE

## 2021-08-26 PROCEDURE — 2022F DILAT RTA XM EVC RTNOPTHY: CPT | Performed by: FAMILY MEDICINE

## 2021-08-26 PROCEDURE — 99214 OFFICE O/P EST MOD 30 MIN: CPT | Performed by: FAMILY MEDICINE

## 2021-08-26 PROCEDURE — G8417 CALC BMI ABV UP PARAM F/U: HCPCS | Performed by: FAMILY MEDICINE

## 2021-08-26 PROCEDURE — 1036F TOBACCO NON-USER: CPT | Performed by: FAMILY MEDICINE

## 2021-08-26 PROCEDURE — 3044F HG A1C LEVEL LT 7.0%: CPT | Performed by: FAMILY MEDICINE

## 2021-08-26 RX ORDER — FUROSEMIDE 40 MG/1
40 TABLET ORAL DAILY PRN
Qty: 30 TABLET | Refills: 1 | Status: SHIPPED | OUTPATIENT
Start: 2021-08-26

## 2021-08-26 RX ORDER — CETIRIZINE HYDROCHLORIDE 10 MG/1
10 TABLET ORAL DAILY
COMMUNITY
End: 2021-10-11

## 2021-08-26 RX ORDER — POTASSIUM CHLORIDE 20 MEQ/1
20 TABLET, EXTENDED RELEASE ORAL DAILY PRN
Qty: 30 TABLET | Refills: 1 | Status: SHIPPED | OUTPATIENT
Start: 2021-08-26

## 2021-08-26 ASSESSMENT — ENCOUNTER SYMPTOMS
ANAL BLEEDING: 0
CONSTIPATION: 0
ABDOMINAL PAIN: 0
CHEST TIGHTNESS: 0
SHORTNESS OF BREATH: 0
NAUSEA: 0
VOMITING: 0
BLOOD IN STOOL: 0
DIARRHEA: 0

## 2021-08-26 NOTE — PATIENT INSTRUCTIONS
Ok to continue Prednisone taper 20 mg- 1.5 pills daily for 3 days, then 1 pill daily for 3 days, then 1/2 pill daily for 3 days   After discussion with pt, will check Ultrasound of soft tissues of neck/ thyroid at this time. Consider ENT referral if symptoms persist, despite oral steroid taper, Flonase, and Zyrtec. Continue to work diligently on diet, exercise, and weight loss  Home BP's- call if > 140/90 on a regular basis. Check HG A1c, FLP, CMP, and CBC in 6-8 months  Continue current meds  Refills  Follow up in 68 months                Preventive Health Topics:  Encouraged COVID VACCINE  booster when able  Encouraged annual FLU VACCINE after October 1st.    Encouraged PNEUMOVAX 23 at this time- check with insurance re coverage and location of administration. ([facility]ctors office vs local pharmacy)  EYE EXAM due at this time per Dr. Marcellus Burton- please call for appt.  (updated 8/26/2021)

## 2021-08-31 ENCOUNTER — HOSPITAL ENCOUNTER (OUTPATIENT)
Dept: ULTRASOUND IMAGING | Age: 41
Discharge: HOME | End: 2021-08-31
Payer: COMMERCIAL

## 2021-08-31 DIAGNOSIS — R22.1 NECK FULLNESS: ICD-10-CM

## 2021-08-31 PROCEDURE — 76536 US EXAM OF HEAD AND NECK: CPT

## 2021-09-01 ENCOUNTER — PATIENT MESSAGE (OUTPATIENT)
Dept: FAMILY MEDICINE CLINIC | Age: 41
End: 2021-09-01

## 2021-09-01 DIAGNOSIS — J02.9 SORE THROAT: ICD-10-CM

## 2021-09-01 DIAGNOSIS — R22.1 NECK FULLNESS: Primary | ICD-10-CM

## 2021-09-01 NOTE — TELEPHONE ENCOUNTER
From: Oumar Bocanegra  To: Nito Kim MD  Sent: 9/1/2021 10:05 AM EDT  Subject: Visit Follow-Up Question    Dr. Lady Nayak  Seen Ultrasound came back negative. Continue to have ear fullness, soreness to throat. Ok with ENT follow up or whatever you recommend? I don't know any ENT and gladly see your preference.  Thanks

## 2021-09-03 NOTE — TELEPHONE ENCOUNTER
Given worsening symptoms, it is reasonable to treat for possible sinus infection. Start Augmentin 875 mg - 1 pill twice daily x10 days. #20/no refills. Continue other treatment as discussed at last visit. Call update in 7-10 days or sooner if worse.   ES

## 2021-09-07 NOTE — TELEPHONE ENCOUNTER
My Chart message sent to the pt with ES response. Pt was asked to let us know if he still wants the antibiotic sent to the pharmacy.

## 2021-09-09 RX ORDER — AMOXICILLIN AND CLAVULANATE POTASSIUM 875; 125 MG/1; MG/1
1 TABLET, FILM COATED ORAL 2 TIMES DAILY
Qty: 20 TABLET | Refills: 0 | Status: SHIPPED | OUTPATIENT
Start: 2021-09-09 | End: 2021-09-19

## 2021-09-09 NOTE — ADDENDUM NOTE
Addended by: Jeannette Solorzano on: 9/9/2021 05:05 PM     Modules accepted: Orders
Health Care Proxy (HCP)

## 2021-10-11 ENCOUNTER — OFFICE VISIT (OUTPATIENT)
Dept: ENT CLINIC | Age: 41
End: 2021-10-11
Payer: COMMERCIAL

## 2021-10-11 VITALS
HEIGHT: 75 IN | HEART RATE: 86 BPM | SYSTOLIC BLOOD PRESSURE: 132 MMHG | WEIGHT: 315 LBS | TEMPERATURE: 97 F | DIASTOLIC BLOOD PRESSURE: 86 MMHG | RESPIRATION RATE: 16 BRPM | OXYGEN SATURATION: 98 % | BODY MASS INDEX: 39.17 KG/M2

## 2021-10-11 DIAGNOSIS — K21.9 GASTROESOPHAGEAL REFLUX DISEASE WITHOUT ESOPHAGITIS: ICD-10-CM

## 2021-10-11 DIAGNOSIS — J30.1 ALLERGIC RHINITIS DUE TO POLLEN, UNSPECIFIED SEASONALITY: Primary | ICD-10-CM

## 2021-10-11 DIAGNOSIS — Z72.0 TOBACCO CHEW USE: ICD-10-CM

## 2021-10-11 DIAGNOSIS — G47.33 OBSTRUCTIVE SLEEP APNEA: ICD-10-CM

## 2021-10-11 DIAGNOSIS — E66.01 MORBID OBESITY WITH BMI OF 40.0-44.9, ADULT (HCC): ICD-10-CM

## 2021-10-11 PROCEDURE — G8427 DOCREV CUR MEDS BY ELIG CLIN: HCPCS | Performed by: OTOLARYNGOLOGY

## 2021-10-11 PROCEDURE — G8484 FLU IMMUNIZE NO ADMIN: HCPCS | Performed by: OTOLARYNGOLOGY

## 2021-10-11 PROCEDURE — G8417 CALC BMI ABV UP PARAM F/U: HCPCS | Performed by: OTOLARYNGOLOGY

## 2021-10-11 PROCEDURE — 99204 OFFICE O/P NEW MOD 45 MIN: CPT | Performed by: OTOLARYNGOLOGY

## 2021-10-11 PROCEDURE — 1036F TOBACCO NON-USER: CPT | Performed by: OTOLARYNGOLOGY

## 2021-10-11 RX ORDER — ESOMEPRAZOLE MAGNESIUM 20 MG/1
20 FOR SUSPENSION ORAL DAILY
COMMUNITY

## 2021-10-11 NOTE — PROGRESS NOTES
[facility] PHYSICIANS [place] SPECIALTY  [facility] EAR, NOSE AND THROAT  [facility]  Dept: [phone]  Dept Fax: [phone]  Loc: [phone]    Adam Sorto is a 35 y.o. male who was referred by Alison Fernandez MD for:  Chief Complaint   Patient presents with    Other     New patient here for evaluation of his neck fullness and sore throat. Referred by Dr Ana María David.  Pharyngitis       HPI:     Adam Sorto is a 35 y.o. male referred by Dr. Ana María David for evaluation of \"neck fullness\". The patient states he was having problems with neck discomfort and tightness about his neck that prompted an ultrasound to be taken which was essentially within normal limits. An incidental thyroid nodule was found and determined to be low suspicion for malignancy. He has no thyroid disease history either personally or in his family. He does have a history of lifelong obesity weighing in at a morbid obesity BMI 40.3. He has moderate to severe sleep apnea and has been on nasal pillow CPAP for the last several years which she believes is helping him sleep well. He is satisfied with its benefit. He does complain of ear fullness and throat irritation that occur virtually every morning unknown reasons. 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 He also admits to having dipped tobacco for between 8 and 10 years and only having quit a month ago. Part of the reason was because his wife was very concerned that his throat symptoms might somehow represent malignancy. He states that he moved to the area where he placed his tobacco from side to side to try to reduce the adverse effects of having a single focus of carcinogen exposure. He denies having had any hemoptysis or significant dysphagia. His voice has not changed in the recent past.  He has no ear pain.      History: No Known Allergies  Current Outpatient Medications   Medication Sig Dispense Refill    esomeprazole Magnesium (NEXIUM) 20 MG PACK Take 20 mg by mouth daily      furosemide (LASIX) 40 MG tablet Take 1 tablet by mouth daily as needed (edema) 30 tablet 1    potassium chloride (KLOR-CON M) 20 MEQ extended release tablet Take 1 tablet by mouth daily as needed (edema/ Lasix use) 30 tablet 1    fluticasone (FLONASE) 50 MCG/ACT nasal spray USE 2 SPRAYS IN EACH NOSTRIL ONCE DAILY 48 g 3    lisinopril-hydroCHLOROthiazide (PRINZIDE;ZESTORETIC) 10-12.5 MG per tablet TAKE 1 TABLET DAILY 90 tablet 3    Probiotic Product (PROBIOTIC DAILY PO) Take 1 capsule by mouth       No current facility-administered medications for this visit. Past Medical History:   Diagnosis Date    Cellulitis     Factor V Leiden ([facility] [place] [address]     GERD (gastroesophageal reflux disease)     Hypertension     Lymphedema     Obesity     DAYNA (obstructive sleep apnea)     Post-nasal drip       Past Surgical History:   Procedure Laterality Date    CHOLECYSTECTOMY      lap cholecystectomy approx 2009    HYDROCELE EXCISION      TONSILLECTOMY AND ADENOIDECTOMY      TYMPANOSTOMY TUBE PLACEMENT      three times     Family History   Problem Relation Age of Onset    Heart Disease Father         Valvular heart disease (s/p AVR)     Social History     Tobacco Use    Smoking status: Never Smoker    Smokeless tobacco: Former User    Tobacco comment: 2 cans of smokeless tobacco per week   Substance Use Topics    Alcohol use: Yes     Alcohol/week: 12.0 standard drinks     Types: 12 Cans of beer per week     Comment: social         Subjective:      Review of Systems  Rest of review of systems are negative, except as noted in HPI.      Objective:     /86 (Site: Left Upper Arm, Position: Sitting)   Pulse 86   Temp 97 °F (36.1 °C) (Infrared)   Resp 16   Ht 6' 3\" (1.905 m)   Wt (!) 322 lb 9.6 oz (146.3 kg)   SpO2 98%   BMI 40.32 kg/m²     Physical Exam       On general physical exam the patient is pleasant tall markedly overweight middle-aged man in no acute distress. His speech pattern is abnormal for hyponasality. His voice is mildly low in pitch but otherwise clear in character for his age and gender. I heard no throat clearing coughing or inspiratory stridor. On anterior inspection his nose was bilaterally crowded with right-sided septal deviation and right-sided inferior turbinate hypertrophy. His oral cavity and oropharynx were normal for a large tongue base with a class II Mallampati aperture. His tonsillar fossa were devoid of tonsil tissue. His soft palate was well  from the posterior wall and his uvula was of normal size. I carefully examined the gingival buccal and gingival labial sulci and found no areas of leukoplakia or erythroplakia or ulceration. His neck was free of adenopathy and thyromegaly. It was full secondary to his body habitus. No focal tenderness was discovered. Patient's extraocular movements and facial nerve function were within normal limits. Vitals reviewed. No results found.    Lab Results   Component Value Date     08/20/2021     01/13/2021     09/18/2019    K 4.2 08/20/2021    K 4.6 01/13/2021    K 4.0 09/18/2019     08/20/2021     01/13/2021     09/18/2019    CO2 30 08/20/2021    CO2 30 01/13/2021    CO2 30 09/18/2019    BUN 17 08/20/2021    BUN 20 01/13/2021    BUN 17 09/18/2019    CREATININE 1.1 08/20/2021    CREATININE 1.1 01/13/2021    CREATININE 1.1 09/18/2019    CALCIUM 8.2 08/20/2021    CALCIUM 8.4 01/13/2021    CALCIUM 8.7 09/18/2019    PROT 5.2 08/20/2021    PROT 5.0 01/13/2021    PROT 5.3 09/18/2019    LABALBU 3.5 08/20/2021    LABALBU 3.0 01/13/2021    LABALBU 3.4 09/18/2019    BILITOT 0.4 08/20/2021    BILITOT 0.3 01/13/2021    BILITOT 0.5 09/18/2019    ALKPHOS 53 08/20/2021    ALKPHOS 49 01/13/2021    ALKPHOS 47 09/18/2019    AST 33 08/20/2021    AST 43 01/13/2021    AST 24 09/18/2019    ALT 57 08/20/2021    ALT 65 01/13/2021    ALT 38 09/18/2019       All of the past medical history, past surgical history, family history,social history, allergies and current medications were reviewed with the patient. Assessment & Plan   Diagnoses and all orders for this visit:     Diagnosis Orders   1. Allergic rhinitis due to pollen, unspecified seasonality     2. Gastroesophageal reflux disease without esophagitis     3. Obstructive sleep apnea     4. Morbid obesity with BMI of 40.0-44.9, adult ([address]     5. Tobacco chew use      Quit 1 month ago         Based on the patient's history and these physical findings, his throat symptoms are unlikely to be linked in any way with his tobacco dipping. In all likelihood these have more to do with the effects of his CPAP producing eustachian tube insufflation and ear fullness as well as insufflating air into his GI tract that caused him to reflux resulting in chronic laryngopharyngitis. These unfortunately are side effects of chronic CPAP use. Given that he has no signs or symptoms consistent with adenocarcinoma at this time, I do not believe I have recently recommend endoscopy today. However given his anxiety about the problem, I will see him back in approximately 2 months. If symptoms persist, I will perform a flexible nasopharyngoscopy at that time and look for any suspicious lesions. Most important thing he needs to attend to his weight loss. I strongly recommended that he adopt a rigorous exercise program and refrain from any snacking between meals. I also recommended that he work his way up to tolerating distinct hunger pains for at least an hour before he eats any meal, working up to a 2-hour interval gradually. Given that he is very tall, and no expectations that he will get under 200 pounds since he weighed almost 300 pounds in high school.   But he surely should be able to back into the high 200 pounds with exercise and diet control. If not, he should consider bariatric surgery. I explained this in detail to the patient to his satisfaction. I answered his questions and addressed his concerns. He reported being pleased with the outcome of the visit. I will see him back in approximately 2 months to see how he is doing to make sure that he is thriving. If symptoms indicate, I will perform a flexible pharyngoscopy at that time. Return in about 2 months (around 12/11/2021) for Follow-up evaluation and possible nasopharyngoscopy. **This report has been created using voice recognition software. It may contain minor errors which are inherent in voice recognition technology. **

## 2021-10-13 ENCOUNTER — TELEPHONE (OUTPATIENT)
Dept: PULMONOLOGY | Age: 41
End: 2021-10-13

## 2021-10-13 DIAGNOSIS — Z99.89 OSA ON CPAP: Primary | ICD-10-CM

## 2021-10-13 DIAGNOSIS — G47.33 OSA ON CPAP: Primary | ICD-10-CM

## 2021-10-13 NOTE — TELEPHONE ENCOUNTER
[facility], head gear and \"whatever else goes with it\". Was told that he needed an order from Latasha Larios.    Saw Audrey 1/7/21, is scheduled with Latasha Larios 1/6/22

## 2021-12-03 DIAGNOSIS — I10 ESSENTIAL HYPERTENSION: ICD-10-CM

## 2021-12-03 RX ORDER — LISINOPRIL AND HYDROCHLOROTHIAZIDE 12.5; 1 MG/1; MG/1
TABLET ORAL
Qty: 90 TABLET | Refills: 3 | Status: SHIPPED | OUTPATIENT
Start: 2021-12-03 | End: 2022-03-21 | Stop reason: SDUPTHER

## 2021-12-03 NOTE — TELEPHONE ENCOUNTER
This medication refill is regarding a electronic request.  Refill requested by [facility]. Requested Prescriptions     Pending Prescriptions Disp Refills    lisinopril-hydroCHLOROthiazide (PRINZIDE;ZESTORETIC) 10-12.5 MG per tablet [Pharmacy Med Name: LISINOPRIL/HCTZ TABS 10/12.5MG] 90 tablet 3     Sig: TAKE 1 TABLET DAILY     Date of last visit: 8/26/2021   Date of next visit: 4/14/2022  Date of last refill: 12/28/2020 #90/3    Last CMP:   Lab Results   Component Value Date     08/20/2021    K 4.2 08/20/2021     08/20/2021    CO2 30 08/20/2021    BUN 17 08/20/2021    CREATININE 1.1 08/20/2021    GLUCOSE 118 (H) 08/20/2021    CALCIUM 8.2 (L) 08/20/2021    PROT 5.2 (L) 08/20/2021    LABALBU 3.5 08/20/2021    BILITOT 0.4 08/20/2021    ALKPHOS 53 08/20/2021    AST 33 08/20/2021    ALT 57 08/20/2021    LABGLOM 73 (A) 08/20/2021     Rx verified, ordered and set to EP.

## 2022-01-06 ENCOUNTER — OFFICE VISIT (OUTPATIENT)
Dept: PULMONOLOGY | Age: 42
End: 2022-01-06
Payer: COMMERCIAL

## 2022-01-06 VITALS
HEIGHT: 75 IN | OXYGEN SATURATION: 97 % | BODY MASS INDEX: 39.17 KG/M2 | HEART RATE: 68 BPM | DIASTOLIC BLOOD PRESSURE: 84 MMHG | WEIGHT: 315 LBS | TEMPERATURE: 97.8 F | SYSTOLIC BLOOD PRESSURE: 132 MMHG

## 2022-01-06 DIAGNOSIS — G47.33 OSA ON CPAP: Primary | ICD-10-CM

## 2022-01-06 DIAGNOSIS — Z99.89 OSA ON CPAP: Primary | ICD-10-CM

## 2022-01-06 PROCEDURE — 1036F TOBACCO NON-USER: CPT | Performed by: NURSE PRACTITIONER

## 2022-01-06 PROCEDURE — G8417 CALC BMI ABV UP PARAM F/U: HCPCS | Performed by: NURSE PRACTITIONER

## 2022-01-06 PROCEDURE — G8427 DOCREV CUR MEDS BY ELIG CLIN: HCPCS | Performed by: NURSE PRACTITIONER

## 2022-01-06 PROCEDURE — G8484 FLU IMMUNIZE NO ADMIN: HCPCS | Performed by: NURSE PRACTITIONER

## 2022-01-06 PROCEDURE — 99213 OFFICE O/P EST LOW 20 MIN: CPT | Performed by: NURSE PRACTITIONER

## 2022-01-06 ASSESSMENT — ENCOUNTER SYMPTOMS
CHEST TIGHTNESS: 0
DIARRHEA: 0
STRIDOR: 0
WHEEZING: 0
NAUSEA: 0
VOMITING: 0
COUGH: 0
SHORTNESS OF BREATH: 0

## 2022-01-06 NOTE — PROGRESS NOTES
[facility]      Romel Calle         [id number]  1/6/2022   Chief Complaint   Patient presents with    Follow-up     1 year sleep follow up with [facility] download        Pt of Dr. Frances Ruiz    PAP Download:   Mel Keyla or initial AHI: 31.6     Date of initial study: 3/11/19      Compliant  100%  Noncompliant 0%     PAP Type cpap Level  11   Avg Hrs/Day 7hr 29min  AHI: 0.1   Recorded compliance dates: 12/6/21-1/4/22  Machine/Mfg:   [x] ResMed    [] Respironics/Dreamstation   Interface:   [x] Nasal    [] Nasal pillows   [] FFM      Provider:      [x] [facility]     [][facility]     [] [employer]    [] [address]    [] [place]               [] [facility]      [] [employer]    [] [address]      [] Other    Neck Size: 19.75  Mallampati IV  ESS: 1  SAQLI: 87    Here is a scan of the most recent download:            Presentation:   Sarah Dominguez presents for sleep medicine follow up for obstructive sleep apnea  Since the last visit, Sarah Dominguez reports that he has been doing well on therapy. Knows he feels better on the therapy then off of it. Gives example that he fell alseep on cougch without and woke up gasping for air. Jorge Lozada had some intermittent issues with sinus congestion is using flonase and an OTC inhaled substance states is likes Vicks vaporub. confirmed is only inhaling not placing in nares. Does report some improvement with use but has short time of benefit. Reports occasionally waking up around 3 AM not sure if stress related to work or the nasal congestion. Equipment issues: The pressure is  acceptable, the mask is acceptable     Sleep issues:  Do you feel better? Yes  More rested? Yes   Better concentration? yes    Progress History:   Since last visit any new medical issues? No  New ER or hospital visits? No  Any new or changes in medicines? No  Any new sleep medicines? No    Review of Systems -   Review of Systems   Constitutional: Negative for chills, fever and unexpected weight change. Respiratory: Negative for cough, chest tightness, shortness of breath, wheezing and stridor. Cardiovascular: Negative for chest pain and leg swelling. Gastrointestinal: Negative for diarrhea, nausea and vomiting. Genitourinary: Negative for dysuria. Physical Exam:    BMI:  Body mass index is 40.12 kg/m². Wt Readings from Last 3 Encounters:   01/06/22 (!) 321 lb (145.6 kg)   10/11/21 (!) 322 lb 9.6 oz (146.3 kg)   08/26/21 (!) 311 lb (141.1 kg)     Weight stable / unchanged  Vitals: /84 (Site: Left Upper Arm, Position: Sitting, Cuff Size: Medium Adult)   Pulse 68   Temp 97.8 °F (36.6 °C) (Oral)   Ht 6' 3\" (1.905 m)   Wt (!) 321 lb (145.6 kg)   SpO2 97%   BMI 40.12 kg/m²       Physical Exam  Vitals and nursing note reviewed. Constitutional:       General: He is not in acute distress. Appearance: He is well-developed. Comments: BMI 40   HENT:      Head: Normocephalic and atraumatic. Neck:      Trachea: No tracheal deviation. Cardiovascular:      Rate and Rhythm: Normal rate and regular rhythm. Heart sounds: Normal heart sounds. No murmur heard. Pulmonary:      Effort: Pulmonary effort is normal. No respiratory distress. Breath sounds: Normal breath sounds. No stridor. No wheezing or rales. Chest:      Chest wall: No tenderness. Abdominal:      General: Bowel sounds are normal. There is no distension. Palpations: Abdomen is soft. Musculoskeletal:      Cervical back: Neck supple. Right lower leg: Edema present. Left lower leg: Edema present. Comments: Hx lymphedema wears compression stockings   Skin:     General: Skin is warm and dry. Capillary Refill: Capillary refill takes less than 2 seconds. Neurological:      Mental Status: He is alert and oriented to person, place, and time. Psychiatric:         Behavior: Behavior normal.         Thought Content:  Thought content normal.           ASSESSMENT/DIAGNOSIS     Diagnosis Orders 1. DAYNA on CPAP     2. BMI 40.0-44.9, adult [facility])              Plan   Do you need any equipment today? No, written for supplies in October  -Recommend continuing flonase for nasal congestion also discussed trial of acetaminophen and checking household humidity.  - Advised to continue current positive airway pressure therapy with above described pressure. - Advised to keep good compliance with current recommended pressure to get optimal results and clinical improvement  - Recommend 7-9 hours of sleep with PAP  - Instructed to call [employer] company regarding supplies if needed.   -Patient to call my office for earlier appointment if needed for worsening of sleep symptoms.   -Discussed weight loss  - Educated about my impression and plan. Patient verbalizes understanding.   We will see back in: 1 year with download     Information added by my medical assistant/LPN was reviewed today     Electronically signed by QIANA Garcia CNP on 1/6/2022 at 1:34 PM

## 2022-02-01 ENCOUNTER — TELEMEDICINE (OUTPATIENT)
Dept: FAMILY MEDICINE CLINIC | Age: 42
End: 2022-02-01
Payer: COMMERCIAL

## 2022-02-01 DIAGNOSIS — B96.89 ACUTE BACTERIAL SINUSITIS: ICD-10-CM

## 2022-02-01 DIAGNOSIS — J01.90 ACUTE BACTERIAL SINUSITIS: ICD-10-CM

## 2022-02-01 DIAGNOSIS — R06.2 WHEEZING: ICD-10-CM

## 2022-02-01 DIAGNOSIS — J40 BRONCHITIS: Primary | ICD-10-CM

## 2022-02-01 PROCEDURE — 99442 PR PHYS/QHP TELEPHONE EVALUATION 11-20 MIN: CPT | Performed by: NURSE PRACTITIONER

## 2022-02-01 RX ORDER — AMOXICILLIN AND CLAVULANATE POTASSIUM 875; 125 MG/1; MG/1
1 TABLET, FILM COATED ORAL 2 TIMES DAILY
Qty: 20 TABLET | Refills: 0 | Status: SHIPPED | OUTPATIENT
Start: 2022-02-01 | End: 2022-02-11

## 2022-02-01 RX ORDER — PREDNISONE 20 MG/1
20 TABLET ORAL 2 TIMES DAILY
Qty: 10 TABLET | Refills: 0 | Status: SHIPPED | OUTPATIENT
Start: 2022-02-01 | End: 2022-02-06

## 2022-02-01 ASSESSMENT — ENCOUNTER SYMPTOMS
SHORTNESS OF BREATH: 0
CHEST TIGHTNESS: 0
SINUS PRESSURE: 1
EYES NEGATIVE: 1
ABDOMINAL PAIN: 0
BLOOD IN STOOL: 0
RHINORRHEA: 1

## 2022-02-01 NOTE — PROGRESS NOTES
[facility]  [facility]  Dept: [phone]  Dept Fax: [phone]      TELEHEALTH EVALUATION -- Audio/Visual (During AVKOS-10 public health emergency)     Oleg Maciel, was evaluated through a synchronous (real-time) audio-video encounter. The patient (or guardian if applicable) is aware that this is a billable service, which includes applicable co-pays. This Virtual Visit was conducted with patient's (and/or legal guardian's) consent. The visit was conducted pursuant to the emergency declaration under the [address] [place] [address], [address] [place] [address] authority and the [employer] and [employer] Act. Patient identification was verified, and a caregiver was present when appropriate. The patient was located in a state where the provider was licensed to provide care. SUBJECTIVE     Oleg Maciel is a 35 y.o. male    Pt presents for c/o fatigue, body aches starting 2 weeks ago. Starting over this past weekend he noticed worsening of symptoms including wheezing, sinus congestion, chest congestion, mild cough. Taste was off but this has resolved. Home covid was negative.       Patient Active Problem List   Diagnosis    HDL deficiency    Lymphedema of left leg- 1995- due to recurrent cellulitis    Essential hypertension    Gastroesophageal reflux disease without esophagitis    Allergic rhinitis due to pollen    [facility]    Obstructive sleep apnea    Morbid obesity with BMI of 40.0-44.9, adult ([facility])    Tobacco chew use       Current Outpatient Medications   Medication Sig Dispense Refill    predniSONE (DELTASONE) 20 MG tablet Take 1 tablet by mouth 2 times daily for 5 days 10 tablet 0    amoxicillin-clavulanate (AUGMENTIN) 875-125 MG per tablet Take 1 tablet by mouth 2 times daily for 10 days 20 tablet 0    lisinopril-hydroCHLOROthiazide (PRINZIDE;ZESTORETIC) 10-12.5 MG per tablet TAKE 1 TABLET DAILY 90 tablet 3    esomeprazole Magnesium (NEXIUM) 20 MG PACK Take 20 mg by mouth daily      furosemide (LASIX) 40 MG tablet Take 1 tablet by mouth daily as needed (edema) 30 tablet 1    potassium chloride (KLOR-CON M) 20 MEQ extended release tablet Take 1 tablet by mouth daily as needed (edema/ Lasix use) 30 tablet 1    fluticasone (FLONASE) 50 MCG/ACT nasal spray USE 2 SPRAYS IN EACH NOSTRIL ONCE DAILY 48 g 3    Probiotic Product (PROBIOTIC DAILY PO) Take 1 capsule by mouth       No current facility-administered medications for this visit. Review of Systems   Constitutional: Positive for fatigue. Negative for chills, fever and unexpected weight change. HENT: Positive for congestion, postnasal drip, rhinorrhea and sinus pressure. Eyes: Negative. Respiratory: Negative for chest tightness and shortness of breath. Cardiovascular: Negative for chest pain, palpitations and leg swelling. Gastrointestinal: Negative for abdominal pain and blood in stool. Genitourinary: Negative for dysuria. Musculoskeletal: Positive for myalgias. Negative for joint swelling. Skin: Negative for rash. Neurological: Negative for dizziness. Psychiatric/Behavioral: Negative. All other systems reviewed and are negative. OBJECTIVE     Due to this being a TeleHealth encounter, evaluation of the following organ systems is limited: Vitals/Constitutional/EENT/Resp/CV/GI//MS/Neuro/Skin/Heme-Lymph-Imm. PHYSICAL EXAMINATION:  [ INSTRUCTIONS:  \"[x]\" Indicates a positive item  \"[]\" Indicates a negative item  -- DELETE ALL ITEMS NOT EXAMINED]  Vital Signs: (All Vital Signs are pt reported, unless otherwise noted)   There were no vitals taken for this visit.       Constitutional: [] Appears well-developed and well-nourished [x] No apparent distress      [] Abnormal-   Mental status  [x] Alert and awake  [x] Oriented to person/place/time [x]Able to follow commands          Lab Results   Component Value Date    LABA1C 6.0 08/20/2021     No results found for: EAG    Lab Results   Component Value Date    CHOL 129 01/13/2021    TRIG 46 01/13/2021    HDL 36 08/20/2021    LDLCALC 86 01/13/2021    LDLDIRECT 80.33 08/20/2021       Lab Results   Component Value Date     08/20/2021    K 4.2 08/20/2021     08/20/2021    CO2 30 08/20/2021    BUN 17 08/20/2021    CREATININE 1.1 08/20/2021    GLUCOSE 118 (H) 08/20/2021    CALCIUM 8.2 (L) 08/20/2021    PROT 5.2 (L) 08/20/2021    LABALBU 3.5 08/20/2021    BILITOT 0.4 08/20/2021    ALKPHOS 53 08/20/2021    AST 33 08/20/2021    ALT 57 08/20/2021    LABGLOM 73 (A) 08/20/2021       Lab Results   Component Value Date    LABMICR < 1.20 08/20/2021       Lab Results   Component Value Date    TSH 2.720 08/20/2021    T4FREE 1.02 08/20/2021       Lab Results   Component Value Date    WBC 5.7 01/13/2021    HGB 17.7 01/13/2021    HCT 52.0 01/13/2021    MCV 91.1 01/13/2021     01/13/2021       No results found for: PSA, PSADIA      Immunization History   Administered Date(s) Administered    COVID-19, Moderna, Primary or Immunocompromised, PF, 100mcg/0.5mL 12/22/2020, 12/23/2020, 01/20/2021, 01/20/2021    Influenza 11/01/2015    Influenza Vaccine, unspecified formulation 11/01/2016    Influenza Virus Vaccine 10/27/2017, 10/16/2018, 11/15/2020    Tdap (Boostrix, Adacel) 01/01/2010, 10/17/2019       Health Maintenance   Topic Date Due    Pneumococcal 0-64 years Vaccine (1 of 2 - PPSV23) Never done    Diabetic foot exam  Never done    Depression Screen  Never done    Diabetic retinal exam  Never done    Hepatitis B vaccine (1 of 3 - Risk 3-dose series) Never done    COVID-19 Vaccine (3 - Booster for Moderna series) 06/20/2021    Flu vaccine (1) 09/01/2021    A1C test (Diabetic or Prediabetic)  08/20/2022    Diabetic microalbuminuria test  08/20/2022    Lipid screen  08/20/2022    Potassium monitoring  08/20/2022    Creatinine monitoring  08/20/2022    DTaP/Tdap/Td vaccine (3 - Td or Tdap) 10/17/2029    Hepatitis A vaccine  Aged Out    Hib vaccine  Aged Out    Meningococcal (ACWY) vaccine  Aged Out    Hepatitis C screen  Discontinued    HIV screen  Discontinued         Future Appointments   Date Time Provider Yanira Ritika   4/14/2022  9:45 AM Blair Hoover MD [address]   1/10/2023 11:30 AM QIANA Jauregui 35       ASSESSMENT       Diagnosis Orders   1. Bronchitis  predniSONE (DELTASONE) 20 MG tablet   2. Acute bacterial sinusitis  predniSONE (DELTASONE) 20 MG tablet    amoxicillin-clavulanate (AUGMENTIN) 875-125 MG per tablet   3. Wheezing  predniSONE (DELTASONE) 20 MG tablet    amoxicillin-clavulanate (AUGMENTIN) 875-125 MG per tablet       PLAN     Augmentin and prednisone sent to pharmacy  Increase fluids and rest  RTO if symptoms worsen or stay the same    19}    Pursuant to the emergency declaration under the [facility] waiver authority and the [employer] and [employer] Act, this Virtual  Visit was conducted, with patient's consent, to reduce the patient's risk of exposure to COVID-19 and provide continuity of care for an established patient. Services were provided through a video synchronous discussion virtually to substitute for in-person clinic visit. Electronically signed by QIANA Márquez CNP on 2/1/2022 at 9:43 AM    Greater than 20 minutes was spent in contact with patient with greater than 50% in counseling and coordination of care. Call was attempted as a Mychart VV but converted to Telephone call due to connection issues.

## 2022-03-18 DIAGNOSIS — J30.1 ALLERGIC RHINITIS DUE TO POLLEN, UNSPECIFIED SEASONALITY: ICD-10-CM

## 2022-03-18 DIAGNOSIS — I10 ESSENTIAL HYPERTENSION: ICD-10-CM

## 2022-03-18 NOTE — TELEPHONE ENCOUNTER
----- Message from Liborio Graham sent at 3/18/2022 12:46 PM EDT -----  Subject: Refill Request    QUESTIONS  Name of Medication? fluticasone (FLONASE) 50 MCG/ACT nasal spray  Patient-reported dosage and instructions? USE 2 SPRAYS IN EACH NOSTRIL   ONCE DAILY  How many days do you have left? 0  Preferred Pharmacy? [facility] phone number (if available)? [phone]  ---------------------------------------------------------------------------  --------------,  Name of Medication? lisinopril-hydroCHLOROthiazide (PRINZIDE;ZESTORETIC)   10-12.5 MG per tablet  Patient-reported dosage and instructions? TAKE 1 TABLET DAILY  How many days do you have left? 0  Preferred Pharmacy? [facility] phone number (if available)? [phone]  Additional Information for Provider? Fax number is [phone]  ---------------------------------------------------------------------------  --------------  CALL BACK INFO  What is the best way for the office to contact you? Do not leave any   message, patient will call back for answer  Preferred Call Back Phone Number?  [phone]

## 2022-03-18 NOTE — TELEPHONE ENCOUNTER
LM for pt I was calling to clarify some information regarding the medication refill he put in. I let him know the phones were down for the day but we would be back in the office on Monday 3/21/22 if he wanted to call us back. If pt calls back please clarify the pharmacy he would like his prescriptions sent to.

## 2022-03-21 RX ORDER — LISINOPRIL AND HYDROCHLOROTHIAZIDE 12.5; 1 MG/1; MG/1
1 TABLET ORAL DAILY
Qty: 90 TABLET | Refills: 3 | Status: SHIPPED | OUTPATIENT
Start: 2022-03-21

## 2022-03-21 RX ORDER — FLUTICASONE PROPIONATE 50 MCG
2 SPRAY, SUSPENSION (ML) NASAL DAILY
Qty: 3 EACH | Refills: 3 | Status: SHIPPED | OUTPATIENT
Start: 2022-03-21

## 2022-03-21 NOTE — TELEPHONE ENCOUNTER
This medication refill is regarding a telephone request.  Refill requested by patient. Requested Prescriptions     Pending Prescriptions Disp Refills    lisinopril-hydroCHLOROthiazide (PRINZIDE;ZESTORETIC) 10-12.5 MG per tablet 90 tablet 3    fluticasone (FLONASE) 50 MCG/ACT nasal spray 48 g 3     Date of last visit: 2/1/2022   Date of next visit: 4/14/2022  Date of last refill:    -Flonase 6/7/21 #48g/3   -Lisinopril-HCTZ 12/3/21 #90/3  Pharmacy Name: [facility]    Last CMP:   Lab Results   Component Value Date     08/20/2021    K 4.2 08/20/2021     08/20/2021    CO2 30 08/20/2021    BUN 17 08/20/2021    CREATININE 1.1 08/20/2021    GLUCOSE 118 (H) 08/20/2021    CALCIUM 8.2 (L) 08/20/2021    PROT 5.2 (L) 08/20/2021    LABALBU 3.5 08/20/2021    BILITOT 0.4 08/20/2021    ALKPHOS 53 08/20/2021    AST 33 08/20/2021    ALT 57 08/20/2021    LABGLOM 73 (A) 08/20/2021     Rx verified, ordered and set to EP.

## 2022-03-30 ENCOUNTER — HOSPITAL ENCOUNTER (EMERGENCY)
Age: 42
Discharge: HOME | End: 2022-03-30
Payer: COMMERCIAL

## 2022-03-30 VITALS
OXYGEN SATURATION: 96 % | DIASTOLIC BLOOD PRESSURE: 86 MMHG | SYSTOLIC BLOOD PRESSURE: 165 MMHG | TEMPERATURE: 96.8 F | RESPIRATION RATE: 18 BRPM | HEART RATE: 102 BPM

## 2022-03-30 DIAGNOSIS — S61.012A LACERATION OF LEFT THUMB WITHOUT FOREIGN BODY WITHOUT DAMAGE TO NAIL, INITIAL ENCOUNTER: Primary | ICD-10-CM

## 2022-03-30 PROCEDURE — 99213 OFFICE O/P EST LOW 20 MIN: CPT | Performed by: NURSE PRACTITIONER

## 2022-03-30 PROCEDURE — 99213 OFFICE O/P EST LOW 20 MIN: CPT

## 2022-03-30 PROCEDURE — 12001 RPR S/N/AX/GEN/TRNK 2.5CM/<: CPT | Performed by: NURSE PRACTITIONER

## 2022-03-30 RX ORDER — LIDOCAINE HYDROCHLORIDE 10 MG/ML
5 INJECTION, SOLUTION INFILTRATION; PERINEURAL ONCE
Status: DISCONTINUED | OUTPATIENT
Start: 2022-03-30 | End: 2022-03-30 | Stop reason: HOSPADM

## 2022-03-30 ASSESSMENT — PAIN DESCRIPTION - PAIN TYPE: TYPE: ACUTE PAIN

## 2022-03-30 ASSESSMENT — PAIN DESCRIPTION - FREQUENCY: FREQUENCY: INTERMITTENT

## 2022-03-30 ASSESSMENT — PAIN DESCRIPTION - ORIENTATION: ORIENTATION: LEFT

## 2022-03-30 ASSESSMENT — PAIN SCALES - GENERAL: PAINLEVEL_OUTOF10: 2

## 2022-03-30 ASSESSMENT — PAIN DESCRIPTION - LOCATION: LOCATION: FINGER (COMMENT WHICH ONE)

## 2022-03-30 ASSESSMENT — PAIN DESCRIPTION - ONSET: ONSET: SUDDEN

## 2022-03-30 ASSESSMENT — PAIN - FUNCTIONAL ASSESSMENT: PAIN_FUNCTIONAL_ASSESSMENT: PREVENTS OR INTERFERES SOME ACTIVE ACTIVITIES AND ADLS

## 2022-03-30 ASSESSMENT — PAIN DESCRIPTION - DESCRIPTORS: DESCRIPTORS: DISCOMFORT

## 2022-03-30 ASSESSMENT — PAIN DESCRIPTION - PROGRESSION: CLINICAL_PROGRESSION: NOT CHANGED

## 2022-03-30 NOTE — ED PROVIDER NOTES
[address]  Urgent Care Encounter       CHIEF COMPLAINT       Chief Complaint   Patient presents with    Laceration     thumb left thumb  - cut on knife       Nurses Notes reviewed and I agree except as noted in the HPI. HISTORY OF PRESENT ILLNESS   Eric Alaniz is a 38 y.o. male who presents with a laceration to his left thumb. He was cutting a pizza and cut his finger with a knife. This is a new problem that occurred about 45 minutes ago. He did wrap and apply pressure to the wound. Continues to bleed. No numbness or tingling present. Denies decreased ROM. The history is provided by the patient. REVIEW OF SYSTEMS     Review of Systems   Constitutional: Negative for activity change and fever. Musculoskeletal: Positive for myalgias. Skin: Positive for wound. Neurological: Negative for weakness and numbness. PAST MEDICAL HISTORY         Diagnosis Date    Cellulitis     Factor V Leiden ([facility] [address]     GERD (gastroesophageal reflux disease)     Hypertension     Lymphedema     Obesity     DAYNA (obstructive sleep apnea)     Post-nasal drip        SURGICALHISTORY     Patient  has a past surgical history that includes Tonsillectomy and adenoidectomy; Tympanostomy tube placement; Cholecystectomy; and Hydrocele surgery.     CURRENT MEDICATIONS       Discharge Medication List as of 3/30/2022  8:20 PM      CONTINUE these medications which have NOT CHANGED    Details   lisinopril-hydroCHLOROthiazide (PRINZIDE;ZESTORETIC) 10-12.5 MG per tablet Take 1 tablet by mouth daily, Disp-90 tablet, R-3Normal      fluticasone (FLONASE) 50 MCG/ACT nasal spray 2 sprays by Nasal route daily, Disp-3 each, R-3Normal      esomeprazole Magnesium (NEXIUM) 20 MG PACK Take 20 mg by mouth dailyHistorical Med      furosemide (LASIX) 40 MG tablet Take 1 tablet by mouth daily as needed (edema), Disp-30 tablet, R-1Normal      potassium chloride (KLOR-CON M) 20 MEQ extended release tablet Take 1 tablet by mouth daily as needed (edema/ Lasix use), Disp-30 tablet, R-1Normal      Probiotic Product (PROBIOTIC DAILY PO) Take 1 capsule by mouthHistorical Med             ALLERGIES     Patient is has No Known Allergies. Patients   Immunization History   Administered Date(s) Administered    COVID-19, Moderna, Primary or Immunocompromised, PF, 100mcg/0.5mL 12/22/2020, 12/23/2020, 01/20/2021, 01/20/2021    Influenza 11/01/2015    Influenza Vaccine, unspecified formulation 11/01/2016    Influenza Virus Vaccine 10/27/2017, 10/16/2018, 11/15/2020    Tdap (Boostrix, Adacel) 01/01/2010, 10/17/2019       FAMILY HISTORY     Patient's family history includes Heart Disease in his father. SOCIAL HISTORY     Patient  reports that he has never smoked. He quit smokeless tobacco use about 6 months ago. He reports current alcohol use of about 12.0 standard drinks of alcohol per week. He reports that he does not use drugs. PHYSICAL EXAM     ED TRIAGE VITALS  BP: (!) 165/86, Temp: 96.8 °F (36 °C), Pulse: 102, Resp: 18, SpO2: 96 %,Estimated body mass index is 40.12 kg/m² as calculated from the following:    Height as of 1/6/22: 6' 3\" (1.905 m). Weight as of 1/6/22: 321 lb (145.6 kg). ,No LMP for male patient. Physical Exam  Vitals and nursing note reviewed. Constitutional:       General: He is not in acute distress. Cardiovascular:      Rate and Rhythm: Normal rate. Pulmonary:      Effort: Pulmonary effort is normal.   Musculoskeletal:      Left hand: Laceration and tenderness present. No deformity or bony tenderness. Normal range of motion. Normal strength. Normal sensation. Skin:     General: Skin is warm and dry. Findings: Laceration (left thumb pad) and wound present. No erythema. Neurological:      Mental Status: He is alert and oriented to person, place, and time. DIAGNOSTIC RESULTS     Labs:No results found for this visit on 03/30/22.     IMAGING:  None    EKG:  None    URGENT CARE COURSE: Vitals:    03/30/22 1958   BP: (!) 165/86   Pulse: 102   Resp: 18   Temp: 96.8 °F (36 °C)   TempSrc: Temporal   SpO2: 96%       Medications - No data to display       PROCEDURES:  Lac Repair    Date/Time: 3/31/2022 8:16 AM  Performed by: QIANA Hill CNP  Authorized by: QIANA Hill CNP     Consent:     Consent obtained:  Verbal    Consent given by:  Patient    Risks discussed:  Infection, pain, poor cosmetic result and poor wound healing    Alternatives discussed:  No treatment and observation  Anesthesia (see MAR for exact dosages): Anesthesia method:  Local infiltration    Local anesthetic:  Lidocaine 1% w/o epi  Laceration details:     Location:  Finger    Finger location:  L thumb    Length (cm):  2.1    Depth (mm):  5  Repair type:     Repair type:  Simple  Pre-procedure details:     Preparation:  Patient was prepped and draped in usual sterile fashion  Exploration:     Hemostasis achieved with:  Tourniquet and direct pressure    Wound exploration: wound explored through full range of motion      Wound extent: no foreign bodies/material noted, no tendon damage noted and no underlying fracture noted      Contaminated: no    Treatment:     Area cleansed with:  Betadine and saline    Amount of cleaning:  Standard  Skin repair:     Repair method:  Sutures    Suture size:  3-0    Suture material:  Nylon    Suture technique:  Simple interrupted    Number of sutures:  7  Approximation:     Approximation:  Close  Post-procedure details:     Dressing:  Adhesive bandage    Patient tolerance of procedure: Tolerated well, no immediate complications      FINAL IMPRESSION      1. Laceration of left thumb without foreign body without damage to nail, initial encounter      DISPOSITION/ PLAN   DISPOSITION Decision To Discharge 03/30/2022 08:19:10 PM     Successful repair of left thumb laceration using 3-0 nylon simple interrupted sutures. Good hemostasis. Band-Aid applied.   Wound care instructions provided to patient. Advised to follow-up with PCP in 10 days for removal.  May use triple antibiotic ointment as needed. Patient voiced understanding was agreeable with above-mentioned plan. Patient was discharged in good condition.     PATIENT REFERRED TO:  MD Matthew Espinoza[address] [address]      DISCHARGE MEDICATIONS:  Discharge Medication List as of 3/30/2022  8:20 PM          Discharge Medication List as of 3/30/2022  8:20 PM          Discharge Medication List as of 3/30/2022  8:20 PM          QIANA Lewis CNP    (Please note that portions of this note were completed with a voice recognition program. Efforts were made to edit the dictations but occasionally words are mis-transcribed.)            QIANA Lewis CNP  03/31/22 [id number]

## 2022-03-31 NOTE — ED NOTES
Pt here with a laceration to the left thumb. Pt states that he was cutting a pizza with knife and cut himself in the process. Pt has a laceration approximately 2-2.25 cm. Wound is actively bleeding but stops when pressure applied.       Melanie Landaverde RN  03/30/22 2024

## 2022-03-31 NOTE — ED NOTES
Folded 2 x 2 gauze pad applied to wound and secured 1\" coban wrap. No change in patients condition. Discharge instructions discussed with pt and pt verbalized understanding of info given. Pt left stable and ambulated to exit.        Enrico Betancourt RN  03/30/22 2025

## 2022-12-07 DIAGNOSIS — I10 ESSENTIAL HYPERTENSION: ICD-10-CM

## 2022-12-07 RX ORDER — LISINOPRIL AND HYDROCHLOROTHIAZIDE 12.5; 1 MG/1; MG/1
TABLET ORAL
Qty: 90 TABLET | Refills: 0 | Status: SHIPPED | OUTPATIENT
Start: 2022-12-07

## 2022-12-07 NOTE — TELEPHONE ENCOUNTER
OK for refill.      Pt due for annual appt at this time- please schedule- resident schedule ok.  (preferably Thursday afternoon)  ES

## 2022-12-07 NOTE — TELEPHONE ENCOUNTER
Patient's last appointment was : 2/1/2022  Patient's next appointment is :   Future Appointments   Date Time Provider Yanira Yost   1/10/2023 11:30 AM Liudmila Pisano APRN - CNP [facility] [address]     Last refilled:3/21/22    Lab Results   Component Value Date    LABA1C 6.0 08/20/2021     Lab Results   Component Value Date    CHOL 129 01/13/2021    TRIG 46 01/13/2021    HDL 36 08/20/2021    LDLCALC 86 01/13/2021    LDLDIRECT 80.33 08/20/2021     Lab Results   Component Value Date     08/20/2021    K 4.2 08/20/2021     08/20/2021    CO2 30 08/20/2021    BUN 17 08/20/2021    CREATININE 1.1 08/20/2021    GLUCOSE 118 (H) 08/20/2021    CALCIUM 8.2 (L) 08/20/2021    PROT 5.2 (L) 08/20/2021    LABALBU 3.5 08/20/2021    BILITOT 0.4 08/20/2021    ALKPHOS 53 08/20/2021    AST 33 08/20/2021    ALT 57 08/20/2021    LABGLOM 73 (A) 08/20/2021     Lab Results   Component Value Date    TSH 2.720 08/20/2021    T4FREE 1.02 08/20/2021     Lab Results   Component Value Date    WBC 5.7 01/13/2021    HGB 17.7 01/13/2021    HCT 52.0 01/13/2021    MCV 91.1 01/13/2021     01/13/2021

## 2022-12-28 RX ORDER — PREDNISONE 20 MG/1
20 TABLET ORAL 2 TIMES DAILY
Qty: 10 TABLET | Refills: 0 | Status: SHIPPED | OUTPATIENT
Start: 2022-12-28 | End: 2023-01-02

## 2022-12-28 RX ORDER — AMOXICILLIN AND CLAVULANATE POTASSIUM 875; 125 MG/1; MG/1
1 TABLET, FILM COATED ORAL 2 TIMES DAILY
Qty: 14 TABLET | Refills: 0 | Status: SHIPPED | OUTPATIENT
Start: 2022-12-28 | End: 2023-01-04

## 2023-01-13 ENCOUNTER — OFFICE VISIT (OUTPATIENT)
Dept: PULMONOLOGY | Age: 43
End: 2023-01-13
Payer: COMMERCIAL

## 2023-01-13 VITALS
SYSTOLIC BLOOD PRESSURE: 118 MMHG | DIASTOLIC BLOOD PRESSURE: 74 MMHG | BODY MASS INDEX: 38.77 KG/M2 | WEIGHT: 311.8 LBS | HEART RATE: 60 BPM | TEMPERATURE: 97.5 F | OXYGEN SATURATION: 97 % | HEIGHT: 75 IN

## 2023-01-13 DIAGNOSIS — Z99.89 OSA ON CPAP: Primary | ICD-10-CM

## 2023-01-13 DIAGNOSIS — E66.9 OBESITY (BMI 30-39.9): ICD-10-CM

## 2023-01-13 DIAGNOSIS — G47.33 OSA ON CPAP: Primary | ICD-10-CM

## 2023-01-13 PROCEDURE — 3074F SYST BP LT 130 MM HG: CPT | Performed by: NURSE PRACTITIONER

## 2023-01-13 PROCEDURE — G8417 CALC BMI ABV UP PARAM F/U: HCPCS | Performed by: NURSE PRACTITIONER

## 2023-01-13 PROCEDURE — 1036F TOBACCO NON-USER: CPT | Performed by: NURSE PRACTITIONER

## 2023-01-13 PROCEDURE — 3078F DIAST BP <80 MM HG: CPT | Performed by: NURSE PRACTITIONER

## 2023-01-13 PROCEDURE — G8484 FLU IMMUNIZE NO ADMIN: HCPCS | Performed by: NURSE PRACTITIONER

## 2023-01-13 PROCEDURE — G8427 DOCREV CUR MEDS BY ELIG CLIN: HCPCS | Performed by: NURSE PRACTITIONER

## 2023-01-13 PROCEDURE — 99214 OFFICE O/P EST MOD 30 MIN: CPT | Performed by: NURSE PRACTITIONER

## 2023-01-13 ASSESSMENT — ENCOUNTER SYMPTOMS
STRIDOR: 0
WHEEZING: 0
NAUSEA: 0
CHEST TIGHTNESS: 0
DIARRHEA: 0
RESPIRATORY NEGATIVE: 1
VOMITING: 0
ALLERGIC/IMMUNOLOGIC NEGATIVE: 1
EYES NEGATIVE: 1
GASTROINTESTINAL NEGATIVE: 1

## 2023-01-13 NOTE — PROGRESS NOTES
[facility]      Chick Dross         [id number]  1/13/2023   Chief Complaint   Patient presents with    Follow-up     1yr mauro        Pt of Dr. Merribeth Kayser    PAP Download:   Original or initial AHI: 31.6     Date of initial study: 3/11/19      Compliant  100%     Noncompliant 0 %     PAP Type cpap Level  11   Avg Hrs/Day 7hr 29min  AHI: 0.1   Recorded compliance dates , 12/10/22-1/8/23     Machine/Mfg:   [x] ResMed    [] Respironics/Dreamstation   Interface:   [x] Nasal    [] Nasal pillows   [] FFM      Provider:      [x] [facility]     [][employer]     [] [employer]    [] Javier Day    [] [employer]               [] [employer]      [] [employer]    [] [address]      [] Other    Neck Size: 20  Mallampati 4  ESS:  2  SAQLI: 95    Here is a scan of the most recent download:            Presentation:   Mary Beth Cross presents for sleep medicine follow up for obstructive sleep apnea  Since the last visit, Mary Beth Cross has been compliant with his PAP therapy and continues to see benefit from its use. Awake and alert today in the office   BMI 38  No sleep medication use     Weight lost 10 lbs over 1 year    Equipment issues: The pressure is  acceptable, the mask is acceptable     Sleep issues:  Do you feel better? Yes  More rested? Yes   Better concentration? NA  Difficulty falling sleep? No  Difficulty staying asleep? No  Snoring? No    Progress History:   Since last visit any new medical issues? No  New ER or hospital visits? No  Any new or changes in medicines? No  Any new sleep medicines? No    Review of Systems -   Review of Systems   Constitutional: Negative. Negative for chills, fever and unexpected weight change. HENT: Negative. Eyes: Negative. Respiratory: Negative. Negative for chest tightness, wheezing and stridor. Cardiovascular:  Negative for chest pain and leg swelling. Gastrointestinal: Negative. Negative for diarrhea, nausea and vomiting. Endocrine: Negative. Genitourinary: Negative. Negative for dysuria. Musculoskeletal: Negative. Skin: Negative. Allergic/Immunologic: Negative. Neurological: Negative. Hematological: Negative. Psychiatric/Behavioral: Negative. Physical Exam:    BMI:  Body mass index is 38.97 kg/m². Wt Readings from Last 3 Encounters:   01/13/23 (!) 311 lb 12.8 oz (141.4 kg)   01/06/22 (!) 321 lb (145.6 kg)   10/11/21 (!) 322 lb 9.6 oz (146.3 kg)     Vitals: /74 (Site: Left Upper Arm, Position: Sitting, Cuff Size: Large Adult)   Pulse 60   Temp 97.5 °F (36.4 °C) (Infrared)   Ht 6' 3\" (1.905 m)   Wt (!) 311 lb 12.8 oz (141.4 kg)   SpO2 97%   BMI 38.97 kg/m²       Physical Exam  Vitals and nursing note reviewed. Constitutional:       General: He is not in acute distress. Appearance: He is well-developed. He is obese. HENT:      Head: Normocephalic and atraumatic. Neck:      Trachea: No tracheal deviation. Cardiovascular:      Rate and Rhythm: Normal rate and regular rhythm. Heart sounds: Normal heart sounds. No murmur heard. Pulmonary:      Effort: Pulmonary effort is normal. No respiratory distress. Breath sounds: Normal breath sounds. No stridor. No wheezing or rales. Chest:      Chest wall: No tenderness. Abdominal:      General: Bowel sounds are normal. There is no distension. Palpations: Abdomen is soft. Skin:     General: Skin is warm and dry. Capillary Refill: Capillary refill takes less than 2 seconds. Neurological:      Mental Status: He is alert and oriented to person, place, and time. Psychiatric:         Behavior: Behavior normal.         Thought Content: Thought content normal.         Judgment: Judgment normal.         ASSESSMENT/DIAGNOSIS     Diagnosis Orders   1. DAYNA on CPAP  DME Order for CPAP as OP      2. Obesity (BMI 30-39. 9)                 Plan   Do you need any equipment today?  Yes   -Patient's symptoms and AHI are controlled with current settings -Download reviewed with Mark Stout and myself today in the office   -Advised to continue current positive airway pressure therapy with above described pressure. -Advised to keep good compliance with current recommended pressure to get optimal results and clinical improvement  -Recommend 7-9 hours of sleep with PAP  -Instructed to call [employer] company regarding supplies if needed.   -Patient to call office for earlier appointment if needed for worsening of sleep symptoms. -Advised patient not to drive if feeling sleepy  -Discussed weight loss and reaching out to PCP for further weight loss options   -Educated about my impression and plan. Patient verbalizes understanding. Will see Clarence Finn back in: 1 year with download. Information added by my medical assistant/LPN was reviewed today.     Electronically signed by QIANA Maynard CNP on 1/13/2023 at 11:04 AM

## 2023-02-28 DIAGNOSIS — I10 ESSENTIAL HYPERTENSION: ICD-10-CM

## 2023-03-03 RX ORDER — LISINOPRIL AND HYDROCHLOROTHIAZIDE 12.5; 1 MG/1; MG/1
TABLET ORAL
Qty: 90 TABLET | Refills: 0 | Status: SHIPPED | OUTPATIENT
Start: 2023-03-03

## 2023-03-22 DIAGNOSIS — J30.1 ALLERGIC RHINITIS DUE TO POLLEN, UNSPECIFIED SEASONALITY: ICD-10-CM

## 2023-03-22 RX ORDER — FLUTICASONE PROPIONATE 50 MCG
SPRAY, SUSPENSION (ML) NASAL
Qty: 3 EACH | Refills: 3 | Status: SHIPPED | OUTPATIENT
Start: 2023-03-22

## 2023-04-14 NOTE — PROGRESS NOTES
Pt scheduled for an appt with Dr. Domonique Paris on 6/10/20 at 1:30 PM. Pt notified and is agreeable. Records faxed to [phone]. shift summary-
PT A@O X1. PT VERBALIZES WHEN QUESTIONED. SOME SENTENCE USE, SOME MOMENTS OF
CONFUSION, POOR HISTORIAN. O2 3L VIA NC, SATS 98-99%, DOES PULL AT TUBE SATS
90-94 WHEN OFF O2, ON EPISODE OF DESAT INTO 70-80 BUT IMPROVED WITH IN 30 SECS
WITH 3L O2 APPLIED. INCONTENT. RIDGED BLE. NO COMPLAINT OF PAIN WHEN ASKED.
COUGH WET, PRODUCTIVE. EATING WELL BUT COUGHING ON WATER, THICK LIQUIDS
PROVIDED, TOLERATES BETTER. AFEBRILE. WILL CONTINUE TO MONITOR. CALL LIGHT,
BED ALARM, AND SIDE RAILS UP FOR SAFETY.

## 2023-05-26 DIAGNOSIS — I10 ESSENTIAL HYPERTENSION: ICD-10-CM

## 2023-05-26 RX ORDER — LISINOPRIL AND HYDROCHLOROTHIAZIDE 12.5; 1 MG/1; MG/1
TABLET ORAL
Qty: 90 TABLET | Refills: 0 | Status: SHIPPED | OUTPATIENT
Start: 2023-05-26

## 2023-05-31 ENCOUNTER — OFFICE VISIT (OUTPATIENT)
Dept: FAMILY MEDICINE CLINIC | Age: 43
End: 2023-05-31
Payer: COMMERCIAL

## 2023-05-31 VITALS
SYSTOLIC BLOOD PRESSURE: 124 MMHG | RESPIRATION RATE: 18 BRPM | DIASTOLIC BLOOD PRESSURE: 70 MMHG | BODY MASS INDEX: 38.36 KG/M2 | WEIGHT: 315 LBS | HEART RATE: 72 BPM | HEIGHT: 76 IN

## 2023-05-31 DIAGNOSIS — Z00.00 WELL ADULT HEALTH CHECK: Primary | ICD-10-CM

## 2023-05-31 DIAGNOSIS — D68.51 FACTOR 5 LEIDEN MUTATION, HETEROZYGOUS (HCC): ICD-10-CM

## 2023-05-31 DIAGNOSIS — K21.9 GASTROESOPHAGEAL REFLUX DISEASE WITHOUT ESOPHAGITIS: ICD-10-CM

## 2023-05-31 DIAGNOSIS — I89.0 LYMPHEDEMA: ICD-10-CM

## 2023-05-31 DIAGNOSIS — Z12.11 SCREEN FOR COLON CANCER: ICD-10-CM

## 2023-05-31 DIAGNOSIS — G47.33 OBSTRUCTIVE SLEEP APNEA: ICD-10-CM

## 2023-05-31 DIAGNOSIS — I10 ESSENTIAL HYPERTENSION: ICD-10-CM

## 2023-05-31 DIAGNOSIS — E11.9 DIET-CONTROLLED DIABETES MELLITUS (HCC): ICD-10-CM

## 2023-05-31 DIAGNOSIS — E83.51 HYPOCALCEMIA: ICD-10-CM

## 2023-05-31 PROBLEM — Z72.0 TOBACCO CHEW USE: Status: RESOLVED | Noted: 2021-10-11 | Resolved: 2023-05-31

## 2023-05-31 PROCEDURE — 3078F DIAST BP <80 MM HG: CPT | Performed by: FAMILY MEDICINE

## 2023-05-31 PROCEDURE — 3074F SYST BP LT 130 MM HG: CPT | Performed by: FAMILY MEDICINE

## 2023-05-31 PROCEDURE — 99396 PREV VISIT EST AGE 40-64: CPT | Performed by: FAMILY MEDICINE

## 2023-05-31 SDOH — HEALTH STABILITY: PHYSICAL HEALTH: ON AVERAGE, HOW MANY MINUTES DO YOU ENGAGE IN EXERCISE AT THIS LEVEL?: 20 MIN

## 2023-05-31 SDOH — ECONOMIC STABILITY: FOOD INSECURITY: WITHIN THE PAST 12 MONTHS, YOU WORRIED THAT YOUR FOOD WOULD RUN OUT BEFORE YOU GOT MONEY TO BUY MORE.: NEVER TRUE

## 2023-05-31 SDOH — HEALTH STABILITY: PHYSICAL HEALTH: ON AVERAGE, HOW MANY DAYS PER WEEK DO YOU ENGAGE IN MODERATE TO STRENUOUS EXERCISE (LIKE A BRISK WALK)?: 1 DAY

## 2023-05-31 SDOH — ECONOMIC STABILITY: INCOME INSECURITY: HOW HARD IS IT FOR YOU TO PAY FOR THE VERY BASICS LIKE FOOD, HOUSING, MEDICAL CARE, AND HEATING?: NOT HARD AT ALL

## 2023-05-31 SDOH — ECONOMIC STABILITY: HOUSING INSECURITY
IN THE LAST 12 MONTHS, WAS THERE A TIME WHEN YOU DID NOT HAVE A STEADY PLACE TO SLEEP OR SLEPT IN A SHELTER (INCLUDING NOW)?: NO

## 2023-05-31 SDOH — ECONOMIC STABILITY: FOOD INSECURITY: WITHIN THE PAST 12 MONTHS, THE FOOD YOU BOUGHT JUST DIDN'T LAST AND YOU DIDN'T HAVE MONEY TO GET MORE.: NEVER TRUE

## 2023-05-31 ASSESSMENT — ENCOUNTER SYMPTOMS
GASTROINTESTINAL NEGATIVE: 1
RESPIRATORY NEGATIVE: 1

## 2023-05-31 ASSESSMENT — PATIENT HEALTH QUESTIONNAIRE - PHQ9
SUM OF ALL RESPONSES TO PHQ QUESTIONS 1-9: 0
1. LITTLE INTEREST OR PLEASURE IN DOING THINGS: 0
SUM OF ALL RESPONSES TO PHQ9 QUESTIONS 1 & 2: 0
SUM OF ALL RESPONSES TO PHQ QUESTIONS 1-9: 0
2. FEELING DOWN, DEPRESSED OR HOPELESS: 0

## 2023-05-31 NOTE — PROGRESS NOTES
2023    Abel Ivy (:  1980) is a 37 y.o. male, here for a preventive medicine evaluation. Chief Complaint   Patient presents with    Discuss Medications    Establish Care    Edema     Leg swelling getting worse      NP to establish. Hx of HTN, well controlled on the Prinzide. BP Readings from Last 3 Encounters:   23 124/70   23 118/74   22 (!) 165/86     Wt Readings from Last 3 Encounters:   23 (!) 315 lb 9.6 oz (143.2 kg)   23 (!) 311 lb 12.8 oz (141.4 kg)   22 (!) 321 lb (145.6 kg)     Hx of lymphedema, L>R. Hx of severe cellulitis in his left leg at age 14, spent several days in the hospital at that time. Hx of normal variant hypoalbuminemia per [facility] in the past.      Patient Active Problem List   Diagnosis    HDL deficiency    Lymphedema of left leg- - due to recurrent cellulitis    Essential hypertension    Gastroesophageal reflux disease without esophagitis    Allergic rhinitis due to pollen    Factor 5 Leiden mutation, heterozygous ([facility] [address]    Obstructive sleep apnea    Morbid obesity with BMI of 40.0-44.9, adult ([facility] [address]       Review of Systems   Constitutional: Negative. HENT: Negative. Respiratory: Negative. Cardiovascular:  Positive for leg swelling. Gastrointestinal: Negative. Musculoskeletal: Negative. All other systems reviewed and are negative. Prior to Visit Medications    Medication Sig Taking?  Authorizing Provider   lisinopril-hydroCHLOROthiazide (PRINZIDE;ZESTORETIC) 10-12.5 MG per tablet TAKE ONE TABLET BY MOUTH DAILY Yes Myah Pierce DO   fluticasone [facility]) 50 MCG/ACT nasal spray INSTILL TWO SPRAYS IN EACH NOSTRIL DAILY Yes Myah Pierce DO   esomeprazole Magnesium (NEXIUM) 20 MG PACK Take 1 packet by mouth daily Yes Historical Provider, MD   Probiotic Product (PROBIOTIC DAILY PO) Take 1 capsule by mouth Yes Historical Provider, MD        No Known Allergies    Past Medical History:

## 2023-06-09 ENCOUNTER — NURSE ONLY (OUTPATIENT)
Dept: LAB | Age: 43
End: 2023-06-09

## 2023-06-09 DIAGNOSIS — Z00.00 WELL ADULT HEALTH CHECK: ICD-10-CM

## 2023-06-09 DIAGNOSIS — E83.51 HYPOCALCEMIA: ICD-10-CM

## 2023-06-09 LAB
25(OH)D3 SERPL-MCNC: 19 NG/ML (ref 30–100)
ALBUMIN SERPL BCG-MCNC: 3.1 G/DL (ref 3.5–5.1)
ALP SERPL-CCNC: 58 U/L (ref 38–126)
ALT SERPL W/O P-5'-P-CCNC: 77 U/L (ref 11–66)
ANION GAP SERPL CALC-SCNC: 6 MEQ/L (ref 8–16)
AST SERPL-CCNC: 48 U/L (ref 5–40)
BASOPHILS ABSOLUTE: 0 THOU/MM3 (ref 0–0.1)
BASOPHILS NFR BLD AUTO: 0.3 %
BILIRUB SERPL-MCNC: 0.3 MG/DL (ref 0.3–1.2)
BUN SERPL-MCNC: 18 MG/DL (ref 7–22)
CALCIUM SERPL-MCNC: 8.2 MG/DL (ref 8.5–10.5)
CHLORIDE SERPL-SCNC: 105 MEQ/L (ref 98–111)
CHOLEST SERPL-MCNC: 122 MG/DL (ref 100–199)
CO2 SERPL-SCNC: 30 MEQ/L (ref 23–33)
CREAT SERPL-MCNC: 1 MG/DL (ref 0.4–1.2)
DEPRECATED MEAN GLUCOSE BLD GHB EST-ACNC: 165 MG/DL (ref 70–126)
DEPRECATED RDW RBC AUTO: 42 FL (ref 35–45)
EOSINOPHIL NFR BLD AUTO: 2.3 %
EOSINOPHILS ABSOLUTE: 0.1 THOU/MM3 (ref 0–0.4)
ERYTHROCYTE [DISTWIDTH] IN BLOOD BY AUTOMATED COUNT: 12.7 % (ref 11.5–14.5)
GFR SERPL CREATININE-BSD FRML MDRD: > 60 ML/MIN/1.73M2
GLUCOSE SERPL-MCNC: 145 MG/DL (ref 70–108)
HBA1C MFR BLD HPLC: 7.5 % (ref 4.4–6.4)
HCT VFR BLD AUTO: 53.7 % (ref 42–52)
HDLC SERPL-MCNC: 34 MG/DL
HGB BLD-MCNC: 18 GM/DL (ref 14–18)
IMM GRANULOCYTES # BLD AUTO: 0.03 THOU/MM3 (ref 0–0.07)
IMM GRANULOCYTES NFR BLD AUTO: 0.5 %
LDLC SERPL CALC-MCNC: 79 MG/DL
LYMPHOCYTES ABSOLUTE: 0.5 THOU/MM3 (ref 1–4.8)
LYMPHOCYTES NFR BLD AUTO: 8.9 %
MCH RBC QN AUTO: 30.5 PG (ref 26–33)
MCHC RBC AUTO-ENTMCNC: 33.5 GM/DL (ref 32.2–35.5)
MCV RBC AUTO: 91 FL (ref 80–94)
MONOCYTES ABSOLUTE: 0.6 THOU/MM3 (ref 0.4–1.3)
MONOCYTES NFR BLD AUTO: 9.9 %
NEUTROPHILS NFR BLD AUTO: 78.1 %
NRBC BLD AUTO-RTO: 0 /100 WBC
PLATELET # BLD AUTO: 186 THOU/MM3 (ref 130–400)
PMV BLD AUTO: 10.6 FL (ref 9.4–12.4)
POTASSIUM SERPL-SCNC: 4.4 MEQ/L (ref 3.5–5.2)
PROT SERPL-MCNC: 5 G/DL (ref 6.1–8)
PTH-INTACT SERPL-MCNC: 32.5 PG/ML (ref 15–65)
RBC # BLD AUTO: 5.9 MILL/MM3 (ref 4.7–6.1)
SEGMENTED NEUTROPHILS ABSOLUTE COUNT: 4.8 THOU/MM3 (ref 1.8–7.7)
SODIUM SERPL-SCNC: 141 MEQ/L (ref 135–145)
TRIGL SERPL-MCNC: 44 MG/DL (ref 0–199)
TSH SERPL DL<=0.005 MIU/L-ACNC: 3.36 UIU/ML (ref 0.4–4.2)
WBC # BLD AUTO: 6.1 THOU/MM3 (ref 4.8–10.8)

## 2023-06-20 ENCOUNTER — TELEPHONE (OUTPATIENT)
Dept: FAMILY MEDICINE CLINIC | Age: 43
End: 2023-06-20

## 2023-08-28 DIAGNOSIS — I10 ESSENTIAL HYPERTENSION: ICD-10-CM

## 2023-08-28 RX ORDER — LISINOPRIL AND HYDROCHLOROTHIAZIDE 12.5; 1 MG/1; MG/1
TABLET ORAL
Qty: 90 TABLET | Refills: 3 | Status: SHIPPED | OUTPATIENT
Start: 2023-08-28

## 2024-01-15 NOTE — PROGRESS NOTES
Enio SOSA Wurst         [id number]  1/17/2024   Chief Complaint   Patient presents with    Follow-up     1 year mauro        Pt of Dr. Kyra STAPLES    PAP Download  Original or initial AHI: 31.6     Date of initial study: 3/11/19      Compliant  100%     Noncompliant 0 %         PAP Type CPAP    Level  11     Avg Hrs/Day 7.5    AHI: 0.0     Recorded compliance dates: 12/16/23-01/14/24    Machine/Mfg:   [x] ResMed    [] [employer] / Respironics    Interface:   [x] Nasal    [] Nasal pillows   [] FFM    Durable Medical Equipment Company:      [employer]    Neck Size:  20    Mallampati:  4    ESS:  0  SAQLI: 92    Scan of the most recent download:            Presentation:   Enio presents for sleep medicine follow up for obstructive sleep apnea  Since the last visit, Enio has been compliant with his PAP therapy and continues to see benefit from its use.   Awake and alert today in the office   AHI is controlled   BMI 39  No sleep medication use at night   Requesting order for new machine today unsure if this will be able to be fulfilled at this time     Equipment issues:  The pressure is  acceptable, the mask is acceptable     Sleep issues:  Do you feel better? Yes  More rested?Yes   Better concentration? yes    Progress History:   Since last visit any new medical issues? No  New ER or hospital visits? No  Any new or changes in medicines? No  Any new sleep medicines? No    Review of Systems -   Review of Systems   Constitutional: Negative.  Negative for chills, fever and unexpected weight change.   HENT: Negative.     Eyes: Negative.    Respiratory: Negative.  Negative for chest tightness, wheezing and stridor.    Cardiovascular:  Negative for chest pain and leg swelling.   Gastrointestinal: Negative.  Negative for diarrhea, nausea and vomiting.   Endocrine: Negative.    Genitourinary: Negative.  Negative for dysuria.   Musculoskeletal: Negative.    Skin: Negative.    Allergic/Immunologic: Negative.    Neurological: Negative.

## 2024-01-17 ENCOUNTER — OFFICE VISIT (OUTPATIENT)
Dept: PULMONOLOGY | Age: 44
End: 2024-01-17
Payer: COMMERCIAL

## 2024-01-17 VITALS
TEMPERATURE: 98 F | HEIGHT: 75 IN | DIASTOLIC BLOOD PRESSURE: 78 MMHG | OXYGEN SATURATION: 97 % | HEART RATE: 59 BPM | SYSTOLIC BLOOD PRESSURE: 128 MMHG | WEIGHT: 312.2 LBS | BODY MASS INDEX: 38.82 KG/M2

## 2024-01-17 DIAGNOSIS — E66.9 OBESITY (BMI 30-39.9): ICD-10-CM

## 2024-01-17 DIAGNOSIS — G47.33 OSA ON CPAP: Primary | ICD-10-CM

## 2024-01-17 PROCEDURE — G8417 CALC BMI ABV UP PARAM F/U: HCPCS | Performed by: NURSE PRACTITIONER

## 2024-01-17 PROCEDURE — 3078F DIAST BP <80 MM HG: CPT | Performed by: NURSE PRACTITIONER

## 2024-01-17 PROCEDURE — G8427 DOCREV CUR MEDS BY ELIG CLIN: HCPCS | Performed by: NURSE PRACTITIONER

## 2024-01-17 PROCEDURE — 1036F TOBACCO NON-USER: CPT | Performed by: NURSE PRACTITIONER

## 2024-01-17 PROCEDURE — G8484 FLU IMMUNIZE NO ADMIN: HCPCS | Performed by: NURSE PRACTITIONER

## 2024-01-17 PROCEDURE — 99214 OFFICE O/P EST MOD 30 MIN: CPT | Performed by: NURSE PRACTITIONER

## 2024-01-17 PROCEDURE — 3074F SYST BP LT 130 MM HG: CPT | Performed by: NURSE PRACTITIONER

## 2024-01-17 ASSESSMENT — ENCOUNTER SYMPTOMS
EYES NEGATIVE: 1
DIARRHEA: 0
STRIDOR: 0
ALLERGIC/IMMUNOLOGIC NEGATIVE: 1
NAUSEA: 0
VOMITING: 0
WHEEZING: 0
GASTROINTESTINAL NEGATIVE: 1
RESPIRATORY NEGATIVE: 1
CHEST TIGHTNESS: 0

## 2024-02-05 DIAGNOSIS — J30.1 ALLERGIC RHINITIS DUE TO POLLEN, UNSPECIFIED SEASONALITY: ICD-10-CM

## 2024-02-05 RX ORDER — FLUTICASONE PROPIONATE 50 MCG
SPRAY, SUSPENSION (ML) NASAL
Qty: 48 G | Refills: 3 | Status: SHIPPED | OUTPATIENT
Start: 2024-02-05

## 2024-02-05 NOTE — TELEPHONE ENCOUNTER
This medication refill is regarding a electronic request. Refill requested by  pharmacy .    Requested Prescriptions     Pending Prescriptions Disp Refills    fluticasone (FLONASE) 50 MCG/ACT nasal spray [Pharmacy Med Name: fluticasone propionate 50 mcg/actuation nasal spray,suspension] 48 g 3     Sig: SPRAY TWO SPRAYS IN EACH NOSTRIL DAILY       Date of last visit: 2/1/2022   Date of next visit: 6/3/2024  Date of last refill: 3/22/23  Pharmacy Name:     Last Lipid Panel:    Lab Results   Component Value Date/Time    CHOL 122 06/09/2023 08:43 AM    TRIG 44 06/09/2023 08:43 AM    HDL 34 06/09/2023 08:43 AM    LDLCALC 79 06/09/2023 08:43 AM     Last CMP:   Lab Results   Component Value Date     06/09/2023    K 4.4 06/09/2023     06/09/2023    CO2 30 06/09/2023    BUN 18 06/09/2023    CREATININE 1.0 06/09/2023    GLUCOSE 145 (H) 06/09/2023    CALCIUM 8.2 (L) 06/09/2023    PROT 5.0 (L) 06/09/2023    LABALBU 3.1 (L) 06/09/2023    BILITOT 0.3 06/09/2023    ALKPHOS 58 06/09/2023    AST 48 (H) 06/09/2023    ALT 77 (H) 06/09/2023    LABGLOM >60 06/09/2023       Last Thyroid:    Lab Results   Component Value Date    TSH 3.360 06/09/2023    T4FREE 1.02 08/20/2021     Last Hemoglobin A1C:    Lab Results   Component Value Date/Time    LABA1C 7.5 06/09/2023 08:44 AM       Rx verified, ordered and set to EP.

## 2024-05-31 ASSESSMENT — PATIENT HEALTH QUESTIONNAIRE - PHQ9
2. FEELING DOWN, DEPRESSED OR HOPELESS: NOT AT ALL
SUM OF ALL RESPONSES TO PHQ QUESTIONS 1-9: 0
2. FEELING DOWN, DEPRESSED OR HOPELESS: NOT AT ALL
SUM OF ALL RESPONSES TO PHQ9 QUESTIONS 1 & 2: 0
SUM OF ALL RESPONSES TO PHQ QUESTIONS 1-9: 0
1. LITTLE INTEREST OR PLEASURE IN DOING THINGS: NOT AT ALL
1. LITTLE INTEREST OR PLEASURE IN DOING THINGS: NOT AT ALL
SUM OF ALL RESPONSES TO PHQ9 QUESTIONS 1 & 2: 0

## 2024-06-03 ENCOUNTER — OFFICE VISIT (OUTPATIENT)
Dept: FAMILY MEDICINE CLINIC | Age: 44
End: 2024-06-03
Payer: COMMERCIAL

## 2024-06-03 VITALS
DIASTOLIC BLOOD PRESSURE: 60 MMHG | WEIGHT: 303.6 LBS | SYSTOLIC BLOOD PRESSURE: 124 MMHG | RESPIRATION RATE: 16 BRPM | HEART RATE: 60 BPM | BODY MASS INDEX: 37.75 KG/M2 | HEIGHT: 75 IN

## 2024-06-03 DIAGNOSIS — D68.51 FACTOR 5 LEIDEN MUTATION, HETEROZYGOUS (HCC): ICD-10-CM

## 2024-06-03 DIAGNOSIS — I10 ESSENTIAL HYPERTENSION: ICD-10-CM

## 2024-06-03 DIAGNOSIS — G47.33 OBSTRUCTIVE SLEEP APNEA: ICD-10-CM

## 2024-06-03 DIAGNOSIS — R79.89 ELEVATED LFTS: ICD-10-CM

## 2024-06-03 DIAGNOSIS — E83.51 HYPOCALCEMIA: ICD-10-CM

## 2024-06-03 DIAGNOSIS — E11.9 DIET-CONTROLLED DIABETES MELLITUS (HCC): ICD-10-CM

## 2024-06-03 DIAGNOSIS — I89.0 LYMPHEDEMA: ICD-10-CM

## 2024-06-03 DIAGNOSIS — Z00.00 WELL ADULT HEALTH CHECK: Primary | ICD-10-CM

## 2024-06-03 DIAGNOSIS — K21.9 GASTROESOPHAGEAL REFLUX DISEASE WITHOUT ESOPHAGITIS: ICD-10-CM

## 2024-06-03 PROCEDURE — 99396 PREV VISIT EST AGE 40-64: CPT | Performed by: FAMILY MEDICINE

## 2024-06-03 PROCEDURE — 3078F DIAST BP <80 MM HG: CPT | Performed by: FAMILY MEDICINE

## 2024-06-03 PROCEDURE — 3074F SYST BP LT 130 MM HG: CPT | Performed by: FAMILY MEDICINE

## 2024-06-03 RX ORDER — LISINOPRIL AND HYDROCHLOROTHIAZIDE 12.5; 1 MG/1; MG/1
1 TABLET ORAL DAILY
Qty: 90 TABLET | Refills: 3 | Status: SHIPPED | OUTPATIENT
Start: 2024-06-03

## 2024-06-03 SDOH — ECONOMIC STABILITY: FOOD INSECURITY: WITHIN THE PAST 12 MONTHS, THE FOOD YOU BOUGHT JUST DIDN'T LAST AND YOU DIDN'T HAVE MONEY TO GET MORE.: NEVER TRUE

## 2024-06-03 SDOH — ECONOMIC STABILITY: FOOD INSECURITY: WITHIN THE PAST 12 MONTHS, YOU WORRIED THAT YOUR FOOD WOULD RUN OUT BEFORE YOU GOT MONEY TO BUY MORE.: NEVER TRUE

## 2024-06-03 SDOH — ECONOMIC STABILITY: INCOME INSECURITY: HOW HARD IS IT FOR YOU TO PAY FOR THE VERY BASICS LIKE FOOD, HOUSING, MEDICAL CARE, AND HEATING?: NOT HARD AT ALL

## 2024-06-03 ASSESSMENT — ENCOUNTER SYMPTOMS
RESPIRATORY NEGATIVE: 1
GASTROINTESTINAL NEGATIVE: 1

## 2024-06-03 NOTE — PROGRESS NOTES
6/3/2024    Enio Cummings (:  1980) is a 44 y.o. male, here for a preventive medicine evaluation.  Chief Complaint   Patient presents with    Annual Exam    Medication Refill     Annual eval.      Due for labs.    Lab Results   Component Value Date    LABA1C 7.5 (H) 2023    LABA1C 6.0 2021    LABA1C 6.9 (H) 2021     Lab Results   Component Value Date    MALBCR 6 2021    CREATININE 1.0 2023     BP's look fine.    BP Readings from Last 3 Encounters:   24 124/60   24 128/78   23 124/70     Wt Readings from Last 3 Encounters:   24 (!) 137.7 kg (303 lb 9.6 oz)   24 (!) 141.6 kg (312 lb 3.2 oz)   23 (!) 143.2 kg (315 lb 9.6 oz)         Subjective   Patient Active Problem List   Diagnosis    HDL deficiency    Lymphedema of left leg- - due to recurrent cellulitis    Essential hypertension    Gastroesophageal reflux disease without esophagitis    Allergic rhinitis due to pollen    Factor 5 Leiden mutation, heterozygous (HCC)    Obstructive sleep apnea    Morbid obesity with BMI of 40.0-44.9, adult ([facility])       Review of Systems   Constitutional: Negative.    HENT: Negative.     Respiratory: Negative.     Cardiovascular: Negative.    Gastrointestinal: Negative.    Musculoskeletal: Negative.    All other systems reviewed and are negative.      Prior to Visit Medications    Medication Sig Taking? Authorizing Provider   lisinopril-hydroCHLOROthiazide (PRINZIDE;ZESTORETIC) 10-12.5 MG per tablet Take 1 tablet by mouth daily Yes Joaquín Weir DO   fluticasone (FLONASE) 50 MCG/ACT nasal spray SPRAY TWO SPRAYS IN EACH NOSTRIL DAILY Yes Joaquín Weir DO   esomeprazole (NEXIUM) 20 MG delayed release capsule Take 1 capsule by mouth daily Yes ProviderHarjit MD   Probiotic Product (PROBIOTIC DAILY PO) Take 1 capsule by mouth Yes Harjit Cox MD        No Known Allergies    Past Medical History:   Diagnosis Date    Cellulitis

## 2024-07-03 ENCOUNTER — NURSE ONLY (OUTPATIENT)
Dept: LAB | Age: 44
End: 2024-07-03

## 2024-07-03 DIAGNOSIS — Z00.00 WELL ADULT HEALTH CHECK: ICD-10-CM

## 2024-07-03 LAB
25(OH)D3 SERPL-MCNC: 25 NG/ML (ref 30–100)
ALBUMIN SERPL BCG-MCNC: 3.4 G/DL (ref 3.5–5.1)
ALP SERPL-CCNC: 45 U/L (ref 38–126)
ALT SERPL W/O P-5'-P-CCNC: 51 U/L (ref 11–66)
ANION GAP SERPL CALC-SCNC: 6 MEQ/L (ref 8–16)
AST SERPL-CCNC: 38 U/L (ref 5–40)
BASOPHILS ABSOLUTE: 0 THOU/MM3 (ref 0–0.1)
BASOPHILS NFR BLD AUTO: 0.6 %
BILIRUB SERPL-MCNC: 0.4 MG/DL (ref 0.3–1.2)
BUN SERPL-MCNC: 20 MG/DL (ref 7–22)
CALCIUM SERPL-MCNC: 8.2 MG/DL (ref 8.5–10.5)
CHLORIDE SERPL-SCNC: 104 MEQ/L (ref 98–111)
CHOLEST SERPL-MCNC: 99 MG/DL (ref 100–199)
CO2 SERPL-SCNC: 29 MEQ/L (ref 23–33)
CREAT SERPL-MCNC: 1.2 MG/DL (ref 0.4–1.2)
DEPRECATED MEAN GLUCOSE BLD GHB EST-ACNC: 123 MG/DL (ref 70–126)
DEPRECATED RDW RBC AUTO: 43.2 FL (ref 35–45)
EOSINOPHIL NFR BLD AUTO: 2.2 %
EOSINOPHILS ABSOLUTE: 0.1 THOU/MM3 (ref 0–0.4)
ERYTHROCYTE [DISTWIDTH] IN BLOOD BY AUTOMATED COUNT: 13.2 % (ref 11.5–14.5)
GFR SERPL CREATININE-BSD FRML MDRD: 75 ML/MIN/1.73M2
GLUCOSE SERPL-MCNC: 103 MG/DL (ref 70–108)
HBA1C MFR BLD HPLC: 6.1 % (ref 4.4–6.4)
HCT VFR BLD AUTO: 49.1 % (ref 42–52)
HDLC SERPL-MCNC: 31 MG/DL
HGB BLD-MCNC: 16.8 GM/DL (ref 14–18)
IMM GRANULOCYTES # BLD AUTO: 0.01 THOU/MM3 (ref 0–0.07)
IMM GRANULOCYTES NFR BLD AUTO: 0.2 %
LDLC SERPL CALC-MCNC: 60 MG/DL
LYMPHOCYTES ABSOLUTE: 0.5 THOU/MM3 (ref 1–4.8)
LYMPHOCYTES NFR BLD AUTO: 10.8 %
MCH RBC QN AUTO: 30.4 PG (ref 26–33)
MCHC RBC AUTO-ENTMCNC: 34.2 GM/DL (ref 32.2–35.5)
MCV RBC AUTO: 88.8 FL (ref 80–94)
MONOCYTES ABSOLUTE: 0.6 THOU/MM3 (ref 0.4–1.3)
MONOCYTES NFR BLD AUTO: 12.4 %
NEUTROPHILS ABSOLUTE: 3.6 THOU/MM3 (ref 1.8–7.7)
NEUTROPHILS NFR BLD AUTO: 73.8 %
NRBC BLD AUTO-RTO: 0 /100 WBC
PLATELET # BLD AUTO: 166 THOU/MM3 (ref 130–400)
PMV BLD AUTO: 11.3 FL (ref 9.4–12.4)
POTASSIUM SERPL-SCNC: 4.4 MEQ/L (ref 3.5–5.2)
PROT SERPL-MCNC: 4.9 G/DL (ref 6.1–8)
RBC # BLD AUTO: 5.53 MILL/MM3 (ref 4.7–6.1)
SODIUM SERPL-SCNC: 139 MEQ/L (ref 135–145)
TRIGL SERPL-MCNC: 41 MG/DL (ref 0–199)
TSH SERPL DL<=0.005 MIU/L-ACNC: 2.67 UIU/ML (ref 0.4–4.2)
WBC # BLD AUTO: 4.9 THOU/MM3 (ref 4.8–10.8)

## 2024-07-26 DIAGNOSIS — I10 ESSENTIAL HYPERTENSION: ICD-10-CM

## 2024-07-26 RX ORDER — LISINOPRIL AND HYDROCHLOROTHIAZIDE 12.5; 1 MG/1; MG/1
1 TABLET ORAL DAILY
Qty: 90 TABLET | Refills: 3 | Status: SHIPPED | OUTPATIENT
Start: 2024-07-26

## 2024-08-07 RX ORDER — PREDNISONE 20 MG/1
TABLET ORAL
Qty: 25 TABLET | Refills: 0 | Status: SHIPPED | OUTPATIENT
Start: 2024-08-07

## 2024-08-08 ENCOUNTER — HOSPITAL ENCOUNTER (OUTPATIENT)
Dept: ULTRASOUND IMAGING | Age: 44
Discharge: HOME | End: 2024-08-08
Payer: COMMERCIAL

## 2024-08-08 ENCOUNTER — LAB (OUTPATIENT)
Dept: LAB | Age: 44
End: 2024-08-08

## 2024-08-08 DIAGNOSIS — E83.51 HYPOCALCEMIA: ICD-10-CM

## 2024-08-08 DIAGNOSIS — K76.0 FATTY LIVER: ICD-10-CM

## 2024-08-08 DIAGNOSIS — E88.09 HYPOALBUMINEMIA: ICD-10-CM

## 2024-08-08 PROCEDURE — 76705 ECHO EXAM OF ABDOMEN: CPT

## 2024-08-09 LAB — CA-I SERPL ISE-SCNC: NORMAL MMOL/L

## 2025-01-22 DIAGNOSIS — J30.1 ALLERGIC RHINITIS DUE TO POLLEN, UNSPECIFIED SEASONALITY: ICD-10-CM

## 2025-01-22 RX ORDER — FLUTICASONE PROPIONATE 50 MCG
SPRAY, SUSPENSION (ML) NASAL
Qty: 48 G | Refills: 3 | Status: SHIPPED | OUTPATIENT
Start: 2025-01-22

## 2025-01-29 ENCOUNTER — LAB (OUTPATIENT)
Dept: LAB | Age: 45
End: 2025-01-29

## 2025-01-31 LAB — PROTEIN ELECTROPHORESIS, SERUM: NORMAL

## 2025-02-02 LAB
A2 MACROGLOB SERPL-MCNC: 159 MG/DL (ref 131–293)
ALT SERPL-CCNC: 33 U/L (ref 5–50)
ANNOTATION COMMENT IMP: ABNORMAL
AST SERPL-CCNC: 27 U/L (ref 9–50)
BUN SERPL-MCNC: 19 MG/DL (ref 7–20)
FIBROSIS STAGE SERPL QL: ABNORMAL
GGT SERPL-CCNC: 14 U/L (ref 7–51)
LIVER FIBR SCORE SERPL CALC.FIBROMETER: 0.36
PATHOLOGY STUDY: ABNORMAL
PLATELET # BLD: 164 K/UL
PT INDEX PPP: 89 % (ref 90–120)
REF LAB TEST RESULTS: 0.04
REF LAB TEST RESULTS: 0.34
REF LAB TEST RESULTS: ABNORMAL

## 2025-02-18 ENCOUNTER — LAB (OUTPATIENT)
Dept: LAB | Age: 45
End: 2025-02-18

## 2025-02-18 LAB
A1AT SERPL-MCNC: 153 MG/DL (ref 90–200)
ALBUMIN SERPL BCG-MCNC: 3.6 G/DL (ref 3.5–5.1)
ALP SERPL-CCNC: 70 U/L (ref 38–126)
ALT SERPL W/O P-5'-P-CCNC: 39 U/L (ref 11–66)
ANION GAP SERPL CALC-SCNC: 5 MEQ/L (ref 8–16)
AST SERPL-CCNC: 28 U/L (ref 5–40)
BASOPHILS ABSOLUTE: 0 THOU/MM3 (ref 0–0.1)
BASOPHILS NFR BLD AUTO: 0.4 %
BILIRUB CONJ SERPL-MCNC: < 0.1 MG/DL (ref 0–0.2)
BILIRUB SERPL-MCNC: 0.6 MG/DL (ref 0.3–1.2)
BUN SERPL-MCNC: 19 MG/DL (ref 7–22)
CALCIUM SERPL-MCNC: 8.9 MG/DL (ref 8.2–9.6)
CHLORIDE SERPL-SCNC: 103 MEQ/L (ref 98–111)
CO2 SERPL-SCNC: 32 MEQ/L (ref 23–33)
CREAT SERPL-MCNC: 0.9 MG/DL (ref 0.4–1.2)
DEPRECATED RDW RBC AUTO: 41.5 FL (ref 35–45)
EOSINOPHIL NFR BLD AUTO: 1.7 %
EOSINOPHILS ABSOLUTE: 0.1 THOU/MM3 (ref 0–0.4)
ERYTHROCYTE [DISTWIDTH] IN BLOOD BY AUTOMATED COUNT: 12.7 % (ref 11.5–14.5)
GFR SERPL CREATININE-BSD FRML MDRD: > 90 ML/MIN/1.73M2
GLUCOSE SERPL-MCNC: 98 MG/DL (ref 70–108)
HCT VFR BLD AUTO: 50.9 % (ref 42–52)
HGB BLD-MCNC: 17.1 GM/DL (ref 14–18)
IGA SERPL-MCNC: 125 MG/DL (ref 70–400)
IGG SERPL-MCNC: 392 MG/DL (ref 700–1600)
IGM SERPL-MCNC: 36 MG/DL (ref 40–230)
IMM GRANULOCYTES # BLD AUTO: 0.01 THOU/MM3 (ref 0–0.07)
IMM GRANULOCYTES NFR BLD AUTO: 0.2 %
LYMPHOCYTES ABSOLUTE: 0.4 THOU/MM3 (ref 1–4.8)
LYMPHOCYTES NFR BLD AUTO: 7.4 %
MCH RBC QN AUTO: 30.1 PG (ref 26–33)
MCHC RBC AUTO-ENTMCNC: 33.6 GM/DL (ref 32.2–35.5)
MCV RBC AUTO: 89.6 FL (ref 80–94)
MONOCYTES ABSOLUTE: 0.4 THOU/MM3 (ref 0.4–1.3)
MONOCYTES NFR BLD AUTO: 7.6 %
NEUTROPHILS ABSOLUTE: 4.3 THOU/MM3 (ref 1.8–7.7)
NEUTROPHILS NFR BLD AUTO: 82.7 %
NRBC BLD AUTO-RTO: 0 /100 WBC
PLATELET # BLD AUTO: 163 THOU/MM3 (ref 130–400)
PMV BLD AUTO: 11.3 FL (ref 9.4–12.4)
POTASSIUM SERPL-SCNC: 4.2 MEQ/L (ref 3.5–5.2)
PROT SERPL-MCNC: 5.3 G/DL (ref 6.1–8)
RBC # BLD AUTO: 5.68 MILL/MM3 (ref 4.7–6.1)
SODIUM SERPL-SCNC: 140 MEQ/L (ref 135–145)
WBC # BLD AUTO: 5.2 THOU/MM3 (ref 4.8–10.8)

## 2025-03-04 NOTE — PROGRESS NOTES
[facility]      Enio SOSA Wurst         [id number]  3/5/2025   Chief Complaint   Patient presents with    Follow-up     Dayna 1 year f/u with download        Patient of Dr. Rae     Assessment/Plan   1. DAYNA on CPAP    2. Obesity (BMI 30-39.9)       -Discussed future options with patient at length today.  This included, following extensive weight loss, reevaluating for sleep apnea with a repeat sleep study, continuing CPAP as he is currently doing, and inspire implant if indicated by repeat sleep study  -At this time patient wishes to continue CPAP therapy as he is currently using  -Will change pressure for comfort related to some ear pain/pressure.  This is likely indicated anyway as the patient has lost approximately 70 pounds    - DME Order for CPAP as OP  - Misc. Devices (CPAP MACHINE) MISC; by Does not apply route Please change his current CPAP/BiPAP pressure to a CPAP 9 cm H2O for comfort  -Please pull download in 2 weeks  Dispense: 1 each; Refill: 0  -Yearly supply order placed? Yes   -Download was personally reviewed and discussed with patient at length.  -The symptoms of DAYNA have improved. The patient is benefiting from therapy and should continue use of PAP device.  -Patient's symptoms and AHI are controlled with current settings of 11 cmH2O. Change current pressure settings as noted.  -Advised to continue current positive airway pressure therapy with above described pressure.   -Advised to keep good compliance with current recommended pressure to get optimal results and clinical improvement  -Recommend 7-9 hours of sleep with PAP  -Instructed to call DME company regarding supplies if needed.   -Patient to call my office for earlier appointment if needed for worsening of sleep symptoms.   -Patient is not to drive if feeling sleepy  -Counseled patient on weight loss    Educated about my impression and plan. Patient verbalizes understanding.      Return in about 1

## 2025-03-05 ENCOUNTER — OFFICE VISIT (OUTPATIENT)
Dept: PULMONOLOGY | Age: 45
End: 2025-03-05
Payer: COMMERCIAL

## 2025-03-05 VITALS
HEART RATE: 65 BPM | BODY MASS INDEX: 30.11 KG/M2 | WEIGHT: 242.2 LBS | OXYGEN SATURATION: 99 % | TEMPERATURE: 97.6 F | DIASTOLIC BLOOD PRESSURE: 62 MMHG | HEIGHT: 75 IN | SYSTOLIC BLOOD PRESSURE: 118 MMHG

## 2025-03-05 DIAGNOSIS — E66.9 OBESITY (BMI 30-39.9): ICD-10-CM

## 2025-03-05 DIAGNOSIS — G47.33 OSA ON CPAP: Primary | ICD-10-CM

## 2025-03-05 PROCEDURE — 99215 OFFICE O/P EST HI 40 MIN: CPT

## 2025-03-05 PROCEDURE — G8427 DOCREV CUR MEDS BY ELIG CLIN: HCPCS

## 2025-03-05 PROCEDURE — 3078F DIAST BP <80 MM HG: CPT

## 2025-03-05 PROCEDURE — 1036F TOBACCO NON-USER: CPT

## 2025-03-05 PROCEDURE — G8417 CALC BMI ABV UP PARAM F/U: HCPCS

## 2025-03-05 PROCEDURE — 3074F SYST BP LT 130 MM HG: CPT

## 2025-03-05 ASSESSMENT — ENCOUNTER SYMPTOMS
WHEEZING: 0
SHORTNESS OF BREATH: 0
SORE THROAT: 0
COUGH: 0
RHINORRHEA: 0

## 2025-07-22 DIAGNOSIS — I10 ESSENTIAL HYPERTENSION: ICD-10-CM

## 2025-07-22 RX ORDER — LISINOPRIL AND HYDROCHLOROTHIAZIDE 10; 12.5 MG/1; MG/1
1 TABLET ORAL DAILY
Qty: 90 TABLET | Refills: 3 | Status: SHIPPED | OUTPATIENT
Start: 2025-07-22

## 2025-07-22 NOTE — TELEPHONE ENCOUNTER
This medication refill is regarding a electronic request. Refill requested by patient.    Requested Prescriptions     Pending Prescriptions Disp Refills    lisinopril-hydroCHLOROthiazide (PRINZIDE;ZESTORETIC) 10-12.5 MG per tablet [Pharmacy Med Name: lisinopril 10 mg-hydrochlorothiazide 12.5 mg tablet] 90 tablet 3     Sig: TAKE ONE TABLET BY MOUTH DAILY     Date of last visit: Visit date not found   Date of next visit: Visit date not found  Date of last refill: 7/26/24#90/3  Pharmacy Name: : [facility] - [address] - P [phone] - F [phone]     Last Lipid Panel:    Lab Results   Component Value Date/Time    CHOL 99 07/03/2024 09:53 AM    TRIG 41 07/03/2024 09:53 AM    HDL 31 07/03/2024 09:53 AM     Last CMP:   Lab Results   Component Value Date     02/18/2025    K 4.2 02/18/2025     02/18/2025    CO2 32 02/18/2025    BUN 19 02/18/2025    CREATININE 0.9 02/18/2025    GLUCOSE 98 02/18/2025    CALCIUM 8.9 02/18/2025    BILITOT 0.6 02/18/2025    ALKPHOS 70 02/18/2025    AST 28 02/18/2025    ALT 39 02/18/2025    LABGLOM > 90 02/18/2025       Last Thyroid:    Lab Results   Component Value Date    TSH 2.670 07/03/2024    T4FREE 1.02 08/20/2021     Last Hemoglobin A1C:    Lab Results   Component Value Date/Time    LABA1C 6.1 07/03/2024 09:53 AM       Rx verified, ordered and set to EP.